# Patient Record
Sex: MALE | Race: WHITE | HISPANIC OR LATINO | Employment: PART TIME | URBAN - METROPOLITAN AREA
[De-identification: names, ages, dates, MRNs, and addresses within clinical notes are randomized per-mention and may not be internally consistent; named-entity substitution may affect disease eponyms.]

---

## 2017-02-09 ENCOUNTER — HOSPITAL ENCOUNTER (EMERGENCY)
Facility: HOSPITAL | Age: 57
Discharge: HOME | End: 2017-02-09
Attending: EMERGENCY MEDICINE

## 2017-02-09 VITALS
SYSTOLIC BLOOD PRESSURE: 160 MMHG | WEIGHT: 200 LBS | RESPIRATION RATE: 18 BRPM | BODY MASS INDEX: 25.67 KG/M2 | HEART RATE: 100 BPM | OXYGEN SATURATION: 98 % | HEIGHT: 74 IN | DIASTOLIC BLOOD PRESSURE: 90 MMHG | TEMPERATURE: 99 F

## 2017-02-09 DIAGNOSIS — M54.9 BACK PAIN, UNSPECIFIED BACK LOCATION, UNSPECIFIED BACK PAIN LATERALITY, UNSPECIFIED CHRONICITY: Primary | ICD-10-CM

## 2017-02-09 PROCEDURE — 25000003 PHARM REV CODE 250: Performed by: EMERGENCY MEDICINE

## 2017-02-09 PROCEDURE — 99283 EMERGENCY DEPT VISIT LOW MDM: CPT

## 2017-02-09 RX ORDER — OXYCODONE AND ACETAMINOPHEN 5; 325 MG/1; MG/1
1 TABLET ORAL EVERY 6 HOURS PRN
Qty: 20 TABLET | Refills: 0 | Status: SHIPPED | OUTPATIENT
Start: 2017-02-09 | End: 2018-05-02 | Stop reason: SDUPTHER

## 2017-02-09 RX ORDER — IBUPROFEN 400 MG/1
800 TABLET ORAL EVERY 6 HOURS PRN
Status: DISCONTINUED | OUTPATIENT
Start: 2017-02-09 | End: 2017-02-09 | Stop reason: HOSPADM

## 2017-02-09 RX ORDER — OXYCODONE AND ACETAMINOPHEN 5; 325 MG/1; MG/1
2 TABLET ORAL
Status: COMPLETED | OUTPATIENT
Start: 2017-02-09 | End: 2017-02-09

## 2017-02-09 RX ORDER — DIAZEPAM 5 MG/1
5 TABLET ORAL EVERY 8 HOURS PRN
Qty: 20 TABLET | Refills: 0 | Status: SHIPPED | OUTPATIENT
Start: 2017-02-09

## 2017-02-09 RX ORDER — DIAZEPAM 5 MG/1
5 TABLET ORAL
Status: COMPLETED | OUTPATIENT
Start: 2017-02-09 | End: 2017-02-09

## 2017-02-09 RX ORDER — IBUPROFEN 800 MG/1
800 TABLET ORAL 3 TIMES DAILY
Qty: 90 TABLET | Refills: 0 | Status: SHIPPED | OUTPATIENT
Start: 2017-02-09

## 2017-02-09 RX ORDER — LIDOCAINE 50 MG/G
3 PATCH TOPICAL ONCE
Qty: 10 PATCH | Refills: 0 | Status: SHIPPED | OUTPATIENT
Start: 2017-02-09 | End: 2017-02-09

## 2017-02-09 RX ADMIN — DIAZEPAM 5 MG: 5 TABLET ORAL at 07:02

## 2017-02-09 RX ADMIN — OXYCODONE AND ACETAMINOPHEN 2 TABLET: 5; 325 TABLET ORAL at 07:02

## 2017-02-09 RX ADMIN — IBUPROFEN 800 MG: 400 TABLET, FILM COATED ORAL at 07:02

## 2017-02-09 NOTE — ED AVS SNAPSHOT
[facility]  [address] [facility] [address]               Randy Mcwilliams   2017  7:15 AM   ED    Descripción:  Male : 1960   Departamento:  [facility]           Murphy Cuidado fue coordinado por:     Provider Role From To    Minoo Hurley MD Attending Provider 17 0718 --      Razón de la dmitry     Back Pain           Diagnósticos de Esta Visita        Comentarios    Back pain, unspecified back location, unspecified back pain laterality, unspecified chronicity    -  Primario       ED Disposition     Ninguna           Lista de tareas           Información de seguimiento     Realice un seguimiento con:  [facility]    Cómo:  Grace elizabeth dmitry lo antes posible    Especialidad:  Family Medicine    Información de contacto:    [address], [address]  [facility] [address] [phone]      Recetas para recoger        Disp Refills Start End    diazePAM (VALIUM) 5 MG tablet 20 tablet 0 2017     Take 1 tablet (5 mg total) by mouth every 8 (eight) hours as needed (muscle spasm). - Oral    [facility]: [facility] [address] [place] [address] No. de tlfo: #: [phone]       ibuprofen (ADVIL,MOTRIN) 800 MG tablet 90 tablet 0 2017     Take 1 tablet (800 mg total) by mouth 3 (three) times daily. - Oral    [facility]: [facility] [address] [place] [address] No. de tlfo: #: [phone]       oxycodone-acetaminophen (PERCOCET) 5-325 mg per tablet 20 tablet 0 2017     Take 1 tablet by mouth every 6 (six) hours as needed for Pain. - Oral    [facility]: [facility] [address] [place] [address] No. de tlfo: #: [phone]         [facility]     [facility] L[facility]nea de Enfermeras - Asistencia   Enfermeras registradas de [facility] pueden ayudarle a reservar elizabeth dmitry, proveer educación para la margarita, asesoría clínica, y otros servicios de asesoramiento.   Llame  para kristen servicio gratuito a [phone].             Medicamentos           Mensaje sobre Medicamentos     Verificar los cambios y / o adiciones a richmond régimen de medicación son los mismos que discutir con richmond médico. Si cualquiera de estos cambios o adiciones son incorrectos, por favor notifique a richmond proveedor de atención médica.        EMPEZAR a alba estos medicamentos NUEVOS        Refills    diazePAM (VALIUM) 5 MG tablet 0    Sig: Take 1 tablet (5 mg total) by mouth every 8 (eight) hours as needed (muscle spasm).    Categoría: Print    Vía: Oral    ibuprofen (ADVIL,MOTRIN) 800 MG tablet 0    Sig: Take 1 tablet (800 mg total) by mouth 3 (three) times daily.    Categoría: Print    Vía: Oral    oxycodone-acetaminophen (PERCOCET) 5-325 mg per tablet 0    Sig: Take 1 tablet by mouth every 6 (six) hours as needed for Pain.    Categoría: Print    Vía: Oral      These medications were administered today        Dose Freq    oxycodone-acetaminophen 5-325 mg per tablet 2 tablet 2 tablet ED 1 Time    Sig: Take 2 tablets by mouth ED 1 Time.    Categoría: Normal    Vía: Oral    ibuprofen tablet 800 mg 800 mg Every 6 hours PRN    Sig: Take 2 tablets (800 mg total) by mouth every 6 (six) hours as needed for mild pain 1-3/10 pain scale.    Categoría: Normal    Vía: Oral    diazePAM tablet 5 mg 5 mg ED 1 Time    Sig: Take 1 tablet (5 mg total) by mouth ED 1 Time.    Categoría: Normal    Vía: Oral           Verifique que la siguiente lista de medicamentos es elizabeth representación exacta de los medicamentos que está tomando actualmente. Si no hay ningunos reportados, la lista puede estar en ty. Si no es correcta, por favor póngase en contacto con richmond proveedor de atención médica. Lleve esta lista con usted en xochilt de emergencia.           Medicamentos Actuales     aspirin 81 MG Chew Take 1 tablet (81 mg total) by mouth once daily.    furosemide (LASIX) 40 MG tablet Take 1 tablet (40 mg total) by mouth 2 (two) times daily.     "carvedilol (COREG) 6.25 MG tablet Take 1 tablet (6.25 mg total) by mouth 2 (two) times daily.    diazePAM (VALIUM) 5 MG tablet Take 1 tablet (5 mg total) by mouth every 8 (eight) hours as needed (muscle spasm).    diazePAM tablet 5 mg Take 1 tablet (5 mg total) by mouth ED 1 Time.    fluticasone (FLONASE) 50 mcg/actuation nasal spray 1 spray by Each Nare route 2 (two) times daily as needed.    ibuprofen (ADVIL,MOTRIN) 800 MG tablet Take 1 tablet (800 mg total) by mouth 3 (three) times daily.    ibuprofen tablet 800 mg Take 2 tablets (800 mg total) by mouth every 6 (six) hours as needed for mild pain 1-3/10 pain scale.    lisinopril (PRINIVIL,ZESTRIL) 5 MG tablet Take 1 tablet (5 mg total) by mouth once daily.    oxycodone-acetaminophen (PERCOCET) 5-325 mg per tablet Take 1 tablet by mouth every 6 (six) hours as needed for Pain.    oxycodone-acetaminophen 5-325 mg per tablet 2 tablet Take 2 tablets by mouth ED 1 Time.    potassium chloride 20 mEq TbSR Take 20 mEq by mouth 2 (two) times daily.           Información de referencia clínica           Misty signos vitales abdirahman     PS Pulso Temperatura Resp [place] Peso    160/90 (BP Location: Right arm, Patient Position: Sitting) 100 98.6 °F (37 °C) (Oral) 18 6' 2" (1.88 m) 90.7 kg (200 lb)    SpO2 BMI (IMC)                98% 25.68 kg/m2          Alergias     A partir del:  2/9/2017        No Known Allergies      Vacunas     Administradas en la fecha de la visita:  2/9/2017        None      ED Micro, Lab, POCT     None      ED Imaging Orders     None        Instrucciones a zelalem de fili         Consejos Para El Cuidado De La Espalda [Back Care Tips]    Hay ciertas cosas que usted puede hacer para prevenir la recurrencia del dolor de espalda tiki y reducir los síntomas del dolor de espalda crónico:  · Mantenga un peso saludable. Si tiene sobrepeso, perder peso le ayudará a aliviar la mayoría de los ofelia de espalda.  · El ejercicio es elizabeth parte importante de la recuperación " de los ofelia de espalda. La espalda está soportada por los músculos que se encuentran detrás y scott de la columna vertebral. [place] significa que los músculos de la espalda y del abdomen deben fortalecerse para zelalem mejor soporte a la columna vertebral.   · Nadar y caminar a paso rápido son buenas formas de ejercicio para mejorar richmond nivel de forma física.  · Practique técnicas seguras para levantar objetos (chris la información que se presenta más abajo).  · Adopte posturas correctas al estar sentado, parado o caminando. Evite permanecer sentado lupe períodos largos, ya que esto implica más esfuerzo en la parte inferior de la espalda que cuando está de pie o caminando.  · Póngase zapatos de buena calidad con suficiente soporte del arco. La alineación de los pies y de los tobillos puede afectar los síntomas de la espalda. Las mujeres deben evitar los zapatos de tacón alto.  · Los masajes terapéuticos pueden ayudar a aliviar el dolor de espalda crónico.  · Lupe los primeros dos días después de elizabeth lesión severa o un ataque de dolor crónico de espalda, aplique elizabeth bolsa de hielo sobre la milagros dolorida lupe 20 minutos cada 2-4 horas. [place] reducirá la hinchazón y el dolor. El calor (duchas o wesly en [place] o elizabeth almohadilla calefactora) ayudan a reducir los espasmos musculares. Puede comenzar aplicando hielo y luego aplicar calor al cabo de dos días. Algunos pacientes se sienten mejor alternando los tratamientos con hielo y con calor. Utilice el método que le dé mejor resultado.  · Puede usar acetaminofén (Tylenol) o ibuprofeno (Motrin o Advil) para controlar el dolor, a menos que le hayan recetado otro medicamento.[NOTA: Si tiene elizabeth enfermedad hepática o renal crónica, o ha tenido alguna vez elizabeth úlcera estomacal o sangrado gastrointestinal, consulte con richmond médico antes de alba estos medicamentos.]     Estiramiento Lumbar  Jennifer es un ejercicio simple de estiramiento que le ayudará a relajar los  espasmos musculares y a mantener la espalda más flexible. Si el ejercicio le empeora el dolor de espalda, deje de hacerlo.  · Acuéstese boca arriba con las rodillas flexionadas y ambos pies apoyados en el piso.  · Levante lentamente la rodilla izquierda hacia el pecho, con la espalda plana contra el piso. Sostenga la posición lupe 5 segundos.  · Relájese y repita el ejercicio con la rodilla derecha.  · Repita el ejercicio 10 veces con cada pierna.  Técnica Para Levantar Objetos De Forma Guerrier  · No doble la cintura para levantar objetos del suelo. En vez de hacer eso, agáchese doblando las rodillas y las caderas.   · Mantenga la espalda recta y la zak erguida.   · Agarre el objeto cerca del cuerpo, directamente frente a usted.  · Enderece las piernas para levantar el objeto.   · Para bajar el objeto, siga la misma técnica en orden inverso.  · Si tiene que arrastrar un objeto por el suelo, empújelo.  Consejos Para Mantener Debbie [place] Postura  SENTADO  Siéntese en chet con respaldos rectos o con soporte para la parte inferior de la espalda. Mantenga las rodillas un poco más elevadas que las caderas. En xochilt necesario, use un banquito o taburete bajo para mantener los pies apoyados sobre debbie superficie sólida.  Siéntese con la espalda recta mientras maneja. Ajuste el asiento hacia adelante para no tener que inclinarse hacia el volante. Debbie almohada pequeña o debbie toalla enrollada detrás de la parte inferior de la espalda podría ser útil para conducir en viajes largos.   DE PIE  Cuando tenga que estar parado (de pie) lupe largos períodos apoye la mayor parte del peso sobre debbie pierna, cambiando de pierna cada pocos minutos.   AL DORMIR  La mejor manera de dormir es de lado, con las rodillas dobladas. Apoye la zak en debbie almohada baja para zelalem soporte al carl de forma que la columna vertebral quede en posición neutral. Evite las almohadas demasiado gruesas que doblan el carl hacia un lado. Ponga debbie  almohada entre las piernas para relajar más la parte inferior de la espalda. Si duerme boca arriba, ponga almohadas debajo de las rodillas para mantener las piernas ligeramente flexionadas. Use un colchón firme. Si richmond colchón se hunde en la parte donde duerme, cámbielo o use elizabeth tabla de ortiz contrachapada de media pulgada de espesor debajo del colchón para darle más soporte.  Programe elizabeth VISITA DE CONTROL con richmond médico, o según le indique nuestro personal médico.  [NOTA: Si le hicieron radiografías, tomografías computarizadas o resonancias magnéticas, estas serán examinadas por un radiólogo y le informarán de los nuevos hallazgos que puedan afectar la atención médica que necesita.]  Regrese Sin Demora  o llame al médico si nota alguno de los siguientes síntomas:  · El dolor empeora o se extiende a los brazos o a las piernas  · Debilidad o entumecimiento en kaitlynn o ambos brazos o piernas.  · Pérdida de control del intestino o de la vejiga  · Entumecimiento en la milagros de las ingles  Date Last Reviewed: 6/1/2016  © 8846-4038 [employer]. [address] [place] [id number]. Todos los derechos reservados. Esta información no pretende sustituir la atención médica profesional. Sólo richmond médico puede diagnosticar y tratar un problema de margarita.          Principios sobre la espalda: Elizabeth columna vertebral gracie  La columna vertebral gracie cuenta con ishmael curvas naturales que soportan el cuerpo y le permiten moverse libremente. Está rodeada de músculos sp y flexibles que la sostienen y mantienen la alineación correcta de lamont curvas. Entre los huesos de la columna vertebral hay unos discos amortiguadores que juegan un papel importante en la buena margarita de la espalda.    Ishmael curvas naturales  La columna vertebral contiene huesos (vértebras) y almohadillas de tejido blando (discos) dispuestos en ishmael curvas: cervical, torácica y lumbar. Si están pietro alineadas, estas curvas mantienen el equilibrio del  cuerpo y lo sostienen lupe el movimiento. Vicente a que distribuyen el peso por toda la columna vertebral, estas curvas disminuyen la probabilidad de lesionarse la espalda.  Músculos ps y flexibles  Es importante tener músculos sp y flexibles en la espalda, para ayudar la columna a sostener las shukri curvas dorsales y mantener la alineación correcta de las vértebras y los discos; y también en el abdomen, las caderas y las piernas, para reducir el esfuerzo de la espalda.  La curva lumbar  La curva lumbar es la parte de la columna vertebral que más fatmata trabaja, ya que es la que soporta más peso y más se mueve. La correcta alineación de esta curva puede prevenir daños a las vértebras, los discos y otras partes de la columna.  Discos amortiguadores  Los discos, unas almohadillas de tejido blando que separan a las vértebras, tienen la finalidad de amortiguar los impactos del movimiento. Cada disco se compone de un núcleo esponjoso en el centro rodeado de un anillo exterior más fatmata. Los movimientos en el interior del núcleo permiten a las vértebras oscilar de un lado a otro sobre los discos, proporcionando la flexibilidad necesaria para inclinarse y caminar.          Date Last Reviewed: 10/18/2015  © 2578-2233 [employer]. [address] [place] [address], [place] [address]. Todos los derechos reservados. Esta información no pretende sustituir la atención médica profesional. Sólo richmond médico puede diagnosticar y tratar un problema de margarita.          Registrarse para [facility]     La activación de richmond cuenta [facility] es tan fácil jeremias 1-2-3!    1) Ir a [email or web address], seleccione Registrarse Ahora, meter el código de activación y richmond fecha de nacimiento, y seleccione Próximo.    [id number]  Expires: 3/26/2017  7:42 AM      2) Crear un nombre de usuario y contraseña para usar cuando se visita [facility] en el futuro y selecciona elizabeth pregunta de seguridad en xochilt de que pierda richmond contraseña y seleccione  Próximo.    3) Introduzca richmond dirección de correo electrónico y agus carl en Registrarse!    Información Adicional  Si tiene alguna pregunta, por favor, e-mail [email or web address] o llame al [phone] para hablar con nuestro personal. Recuerde, [facility] no debe ser usada para necesidades urgentes. En xochilt de emergencia médica, llame al [phone].         [facility] cumple con las leyes federales aplicables de derechos civiles y no discrimina por motivos de yesenia, color, origen nacional, edad, discapacidad, o sexo.        Language Assistance Services     ATTENTION: Language assistance services are available, free of charge. Please call [phone].      ATENCIÓN: Si habla español, tiene a richmond disposición servicios gratuitos de asistencia lingüística. Llame al [phone].     CHÚ Ý: N?u b?n nói Ti?ng Vi?t, có các d?ch v? h? tr? ngôn ng? mi?n phí dành cho b?n. G?i s? [phone].                      [facility]  [address] [facility] [address]               Randy Mcwilliams   2017  7:15 AM   ED    Description:  Male : 1960   Department:  [facility]           Your Care was Coordinated By:     Provider Role From To    Minoo Hurley MD Attending Provider 17 0718 --      Reason for Visit     Back Pain           Diagnoses this Visit        Comments    Back pain, unspecified back location, unspecified back pain laterality, unspecified chronicity    -  Primary       ED Disposition     None           To Do List           Follow-up Information     Schedule an appointment as soon as possible for a visit with [facility].    Specialty:  Family Medicine    Contact information:    [address], [address]  [facility] [address] [phone]       These Medications        Disp Refills Start End    diazePAM (VALIUM) 5 MG tablet 20 tablet 0 2017     Take 1 tablet (5 mg total) by mouth every 8  (eight) hours as needed (muscle spasm). - Oral    Pharmacy: [address] #: [phone]       ibuprofen (ADVIL,MOTRIN) 800 MG tablet 90 tablet 0 2/9/2017     Take 1 tablet (800 mg total) by mouth 3 (three) times daily. - Oral    Pharmacy: [address] #: [phone]       oxycodone-acetaminophen (PERCOCET) 5-325 mg per tablet 20 tablet 0 2/9/2017     Take 1 tablet by mouth every 6 (six) hours as needed for Pain. - Oral    Pharmacy: [address] #: [phone]         [facility]     [facility] - 24/7 Assistance  Registered nurses in the [facility] provide clinical advisement, health education, appointment booking, and other advisory services.  Call for this free service at [phone].             Medications           Message regarding Medications     Verify the changes and/or additions to your medication regime listed below are the same as discussed with your clinician today.  If any of these changes or additions are incorrect, please notify your healthcare provider.        START taking these NEW medications        Refills    diazePAM (VALIUM) 5 MG tablet 0    Sig: Take 1 tablet (5 mg total) by mouth every 8 (eight) hours as needed (muscle spasm).    Class: Print    Route: Oral    ibuprofen (ADVIL,MOTRIN) 800 MG tablet 0    Sig: Take 1 tablet (800 mg total) by mouth 3 (three) times daily.    Class: Print    Route: Oral    oxycodone-acetaminophen (PERCOCET) 5-325 mg per tablet 0    Sig: Take 1 tablet by mouth every 6 (six) hours as needed for Pain.    Class: Print    Route: Oral      These medications were administered today        Dose Freq    oxycodone-acetaminophen 5-325 mg per tablet 2 tablet 2 tablet ED 1 Time    Sig: Take 2 tablets by mouth ED 1 Time.    Class: Normal    Route: Oral    ibuprofen tablet 800 mg 800 mg Every 6  hours PRN    Sig: Take 2 tablets (800 mg total) by mouth every 6 (six) hours as needed for mild pain 1-3/10 pain scale.    Class: Normal    Route: Oral    diazePAM tablet 5 mg 5 mg ED 1 Time    Sig: Take 1 tablet (5 mg total) by mouth ED 1 Time.    Class: Normal    Route: Oral           Verify that the below list of medications is an accurate representation of the medications you are currently taking.  If none reported, the list may be blank. If incorrect, please contact your healthcare provider. Carry this list with you in case of emergency.           Current Medications     aspirin 81 MG Chew Take 1 tablet (81 mg total) by mouth once daily.    furosemide (LASIX) 40 MG tablet Take 1 tablet (40 mg total) by mouth 2 (two) times daily.    carvedilol (COREG) 6.25 MG tablet Take 1 tablet (6.25 mg total) by mouth 2 (two) times daily.    diazePAM (VALIUM) 5 MG tablet Take 1 tablet (5 mg total) by mouth every 8 (eight) hours as needed (muscle spasm).    diazePAM tablet 5 mg Take 1 tablet (5 mg total) by mouth ED 1 Time.    fluticasone (FLONASE) 50 mcg/actuation nasal spray 1 spray by Each Nare route 2 (two) times daily as needed.    ibuprofen (ADVIL,MOTRIN) 800 MG tablet Take 1 tablet (800 mg total) by mouth 3 (three) times daily.    ibuprofen tablet 800 mg Take 2 tablets (800 mg total) by mouth every 6 (six) hours as needed for mild pain 1-3/10 pain scale.    lisinopril (PRINIVIL,ZESTRIL) 5 MG tablet Take 1 tablet (5 mg total) by mouth once daily.    oxycodone-acetaminophen (PERCOCET) 5-325 mg per tablet Take 1 tablet by mouth every 6 (six) hours as needed for Pain.    oxycodone-acetaminophen 5-325 mg per tablet 2 tablet Take 2 tablets by mouth ED 1 Time.    potassium chloride 20 mEq TbSR Take 20 mEq by mouth 2 (two) times daily.           Clinical Reference Information           Your Vitals Were     BP Pulse Temp Resp Height Weight    160/90 (BP Location: Right arm, Patient Position: Sitting) 100 98.6 °F (37 °C) (Oral)  "18 6' 2" (1.88 m) 90.7 kg (200 lb)    SpO2 BMI                98% 25.68 kg/m2          Allergies as of 2/9/2017     No Known Allergies      Immunizations Administered on Date of Encounter - 2/9/2017     None      ED Micro, Lab, POCT     None      ED Imaging Orders     None        Discharge Instructions         Consejos Para El Cuidado De La Espalda [Back Care Tips]    Hay ciertas cosas que usted puede hacer para prevenir la recurrencia del dolor de espalda tiki y reducir los síntomas del dolor de espalda crónico:  · Mantenga un peso saludable. Si tiene sobrepeso, perder peso le ayudará a aliviar la mayoría de los ofelia de espalda.  · El ejercicio es elizabeth parte importante de la recuperación de los ofelia de espalda. La espalda está soportada por los músculos que se encuentran detrás y scott de la columna vertebral. [place] significa que los músculos de la espalda y del abdomen deben fortalecerse para zelalem mejor soporte a la columna vertebral.   · Nadar y caminar a paso rápido son buenas formas de ejercicio para mejorar richmond nivel de forma física.  · Practique técnicas seguras para levantar objetos (chris la información que se presenta más abajo).  · Adopte posturas correctas al estar sentado, parado o caminando. Evite permanecer sentado lupe períodos largos, ya que esto implica más esfuerzo en la parte inferior de la espalda que cuando está de pie o caminando.  · Póngase zapatos de buena calidad con suficiente soporte del arco. La alineación de los pies y de los tobillos puede afectar los síntomas de la espalda. Las mujeres deben evitar los zapatos de tacón alto.  · Los masajes terapéuticos pueden ayudar a aliviar el dolor de espalda crónico.  · Lupe los primeros dos días después de elizabeth lesión severa o un ataque de dolor crónico de espalda, aplique elizabeth bolsa de hielo sobre la milagros dolorida lupe 20 minutos cada 2-4 horas. [place] reducirá la hinchazón y el dolor. El calor (duchas o wesly en [place] o elizabeth " almohadilla calefactora) ayudan a reducir los espasmos musculares. Puede comenzar aplicando hielo y luego aplicar calor al cabo de dos días. Algunos pacientes se sienten mejor alternando los tratamientos con hielo y con calor. Utilice el método que le dé mejor resultado.  · Puede usar acetaminofén (Tylenol) o ibuprofeno (Motrin o Advil) para controlar el dolor, a menos que le hayan recetado otro medicamento.[NOTA: Si tiene elizabeth enfermedad hepática o renal crónica, o ha tenido alguna vez elizabeth úlcera estomacal o sangrado gastrointestinal, consulte con richmond médico antes de alba estos medicamentos.]     Estiramiento Lumbar  Jennifer es un ejercicio simple de estiramiento que le ayudará a relajar los espasmos musculares y a mantener la espalda más flexible. Si el ejercicio le empeora el dolor de espalda, deje de hacerlo.  · Acuéstese boca arriba con las rodillas flexionadas y ambos pies apoyados en el piso.  · Levante lentamente la rodilla izquierda hacia el pecho, con la espalda plana contra el piso. Sostenga la posición lupe 5 segundos.  · Relájese y repita el ejercicio con la rodilla derecha.  · Repita el ejercicio 10 veces con cada pierna.  Técnica Para Levantar Objetos De Forma Guerrier  · No doble la cintura para levantar objetos del suelo. En vez de hacer eso, agáchese doblando las rodillas y las caderas.   · Mantenga la espalda recta y la zak erguida.   · Agarre el objeto cerca del cuerpo, directamente frente a usted.  · Enderece las piernas para levantar el objeto.   · Para bajar el objeto, siga la misma técnica en orden inverso.  · Si tiene que arrastrar un objeto por el suelo, empújelo.  Consejos Para Mantener Elizabeth [place] Postura  SENTADO  Siéntese en chet con respaldos rectos o con soporte para la parte inferior de la espalda. Mantenga las rodillas un poco más elevadas que las caderas. En xochilt necesario, use un banquito o taburete bajo para mantener los pies apoyados sobre elizabeth superficie sólida.  Siéntese con la  espalda recta mientras maneja. Ajuste el asiento hacia adelante para no tener que inclinarse hacia el volante. Elizabeth almohada pequeña o elizabeth toalla enrollada detrás de la parte inferior de la espalda podría ser útil para conducir en viajes largos.   DE PIE  Cuando tenga que estar parado (de pie) lupe largos períodos apoye la mayor parte del peso sobre elizabeth pierna, cambiando de pierna cada pocos minutos.   AL DORMIR  La mejor manera de dormir es de lado, con las rodillas dobladas. Apoye la zak en elizabeth almohada baja para zelalem soporte al carl de forma que la columna vertebral quede en posición neutral. Evite las almohadas demasiado gruesas que doblan el carl hacia un lado. Ponga elizabeth almohada entre las piernas para relajar más la parte inferior de la espalda. Si duerme boca arriba, ponga almohadas debajo de las rodillas para mantener las piernas ligeramente flexionadas. Use un colchón firme. Si richmond colchón se hunde en la parte donde duerme, cámbielo o use elizabeth tabla de ortiz contrachapada de media pulgada de espesor debajo del colchón para darle más soporte.  Programe elizabeth VISITA DE CONTROL con richmond médico, o según le indique nuestro personal médico.  [NOTA: Si le hicieron radiografías, tomografías computarizadas o resonancias magnéticas, estas serán examinadas por un radiólogo y le informarán de los nuevos hallazgos que puedan afectar la atención médica que necesita.]  Regrese Sin Demora  o llame al médico si nota alguno de los siguientes síntomas:  · El dolor empeora o se extiende a los brazos o a las piernas  · Debilidad o entumecimiento en kaitlynn o ambos brazos o piernas.  · Pérdida de control del intestino o de la vejiga  · Entumecimiento en la milagros de las ingles  Date Last Reviewed: 6/1/2016  © 3705-5429 [employer]. [address] [place] [address]. Todos los derechos reservados. Esta información no pretende sustituir la atención médica profesional. Sólo richmond médico puede diagnosticar y tratar  un problema de margarita.          Principios sobre la espalda: Debbie columna vertebral gracie  La columna vertebral gracie cuenta con ishmael curvas naturales que soportan el cuerpo y le permiten moverse libremente. Está rodeada de músculos sp y flexibles que la sostienen y mantienen la alineación correcta de lamont curvas. Entre los huesos de la columna vertebral hay unos discos amortiguadores que juegan un papel importante en la buena margarita de la espalda.    Ishmael curvas naturales  La columna vertebral contiene huesos (vértebras) y almohadillas de tejido blando (discos) dispuestos en ishmael curvas: cervical, torácica y lumbar. Si están pietro alineadas, estas curvas mantienen el equilibrio del cuerpo y lo sostienen lupe el movimiento. Vicente a que distribuyen el peso por toda la columna vertebral, estas curvas disminuyen la probabilidad de lesionarse la espalda.  Músculos sp y flexibles  Es importante tener músculos sp y flexibles en la espalda, para ayudar la columna a sostener las ishmael curvas dorsales y mantener la alineación correcta de las vértebras y los discos; y también en el abdomen, las caderas y las piernas, para reducir el esfuerzo de la espalda.  La curva lumbar  La curva lumbar es la parte de la columna vertebral que más fatmata trabaja, ya que es la que soporta más peso y más se mueve. La correcta alineación de esta curva puede prevenir daños a las vértebras, los discos y otras partes de la columna.  Discos amortiguadores  Los discos, unas almohadillas de tejido blando que separan a las vértebras, tienen la finalidad de amortiguar los impactos del movimiento. Cada disco se compone de un núcleo esponjoso en el centro rodeado de un anillo exterior más fatmata. Los movimientos en el interior del núcleo permiten a las vértebras oscilar de un lado a otro sobre los discos, proporcionando la flexibilidad necesaria para inclinarse y caminar.          Date Last Reviewed: 10/18/2015  © 6643-7799 [employer] [address] [place] [id number]. Todos los derechos reservados. Esta información no pretende sustituir la atención médica profesional. Sólo richmond médico puede diagnosticar y tratar un problema de margarita.          [facility] Sign-Up     Activating your [facility] account is as easy as 1-2-3!     1) Visit [email or web address], select Sign Up Now, enter this activation code and your date of birth, then select Next.  [email or web address]  Expires: 3/26/2017  7:42 AM      2) Create a username and password to use when you visit [facility] in the future and select a security question in case you lose your password and select Next.    3) Enter your e-mail address and click Sign Up!    Additional Information  If you have questions, please e-mail [email or web address] or call [phone] to talk to our [facility] staff. Remember, [facility] is NOT to be used for urgent needs. For medical emergencies, dial 911.          [facility] complies with applicable Federal civil rights laws and does not discriminate on the basis of race, color, national origin, age, disability, or sex.        Language Assistance Services     ATTENTION: Language assistance services are available, free of charge. Please call [phone].      ATENCIÓN: Si habla español, tiene a richmond disposición servicios gratuitos de asistencia lingüística. Llame al [phone].     CHÚ Ý: N?u b?n nói Ti?ng Vi?t, có các d?ch v? h? tr? ngôn ng? mi?n phí dành cho b?n. G?i s? [phone].

## 2017-02-09 NOTE — ED PROVIDER NOTES
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  Denies pain with coughing sneezing.  Denies weakness or numbness.  He went to sleep last night and awoke this morning and feels like he is having a hard time walking because of the pain.  Denies urinary or bowel incontinence.    Patient is a 56 y.o. male presenting with the following complaint: back pain. The history is provided by the patient.   Back Pain    This is a new problem. The current episode started yesterday. The problem occurs constantly. The problem has been gradually worsening. The pain is associated with lifting heavy objects. The pain is present in the lumbar spine. The quality of the pain is described as aching. The pain does not radiate. The pain is at a severity of 7/10. The symptoms are aggravated by bending and certain positions. The pain is worse during the day. Pertinent negatives include no fever, no numbness, no abdominal swelling and no bowel incontinence. He has tried ice for the symptoms. The treatment provided mild relief.     Past Medical History   Diagnosis Date    Acute systolic congestive heart failure 1/7/2016     EF 20%     Past Medical History Pertinent Negatives   Diagnosis Date Noted    Diabetes mellitus 1/7/2016    Encounter for blood transfusion 1/7/2016     Past Surgical History   Procedure Laterality Date    Leg surgery       No family history on file.  Social History   Substance Use Topics    Smoking status: Never Smoker    Smokeless tobacco: Not on file    Alcohol use No     Review of Systems   Constitutional: Negative for fever.   Eyes: Negative.    Respiratory: Negative.     Gastrointestinal: Negative for bowel incontinence.   Genitourinary: Negative.    Musculoskeletal: Positive for back pain.   Neurological: Negative for numbness.   All other systems reviewed and are negative.      Physical Exam   Initial Vitals   BP Pulse Resp Temp SpO2   02/09/17 0713 02/09/17 0713 02/09/17 0713 02/09/17 0713 02/09/17 0713   160/90 100 18 98.6 °F (37 °C) 98 %     Physical Exam    Nursing note and vitals reviewed.  Constitutional: He appears well-developed and well-nourished.   HENT:   Head: Normocephalic.   Eyes: EOM are normal. Pupils are equal, round, and reactive to light.   Neck: Normal range of motion.   Cardiovascular: Normal rate.   Pulmonary/Chest: Breath sounds normal.   Abdominal: Soft. Bowel sounds are normal.   Musculoskeletal: Normal range of motion.   He has pain with flexion less pain with extension he has 5 out of 5 strength of his lower extremities.  He walks on his tiptoes he has no clonus no hyperreflexia.   Neurological: He is alert and oriented to person, place, and time.   Skin: Skin is warm and dry.   Psychiatric: He has a normal mood and affect. His behavior is normal. Thought content normal.         ED Course   Procedures  Labs Reviewed - No data to display          Medical Decision Making:   Initial Assessment:   56-year-old otherwise healthy male with lower back pain that occurred after lifting heavy sheet rock  Differential Diagnosis:   Muscular spasm, no evidence of fracture  ED Management:  Patient given instructions on how to lift with his legs not his back.  Reviewed some exercises.  Given recommendations or concourse strengthening.  Given pain medications, and follow-up with his primary care doctor.                   ED Course     Clinical Impression:    The encounter diagnosis was Back pain, unspecified back location, unspecified back pain laterality, unspecified chronicity.          Minoo Hurley MD  02/09/17 6314

## 2017-02-09 NOTE — DISCHARGE INSTRUCTIONS
Consejos Para El Cuidado De La Espalda [Back Care Tips]    Hay ciertas cosas que usted puede hacer para prevenir la recurrencia del dolor de espalda tiki y reducir los síntomas del dolor de espalda crónico:  · Mantenga un peso saludable. Si tiene sobrepeso, perder peso le ayudará a aliviar la mayoría de los ofelia de espalda.  · El ejercicio es elizabeth parte importante de la recuperación de los ofelia de espalda. La espalda está soportada por los músculos que se encuentran detrás y scott de la columna vertebral. [place] significa que los músculos de la espalda y del abdomen deben fortalecerse para zelalem mejor soporte a la columna vertebral.   · Nadar y caminar a paso rápido son buenas formas de ejercicio para mejorar richmond nivel de forma física.  · Practique técnicas seguras para levantar objetos (chris la información que se presenta más abajo).  · Adopte posturas correctas al estar sentado, parado o caminando. Evite permanecer sentado lupe períodos largos, ya que esto implica más esfuerzo en la parte inferior de la espalda que cuando está de pie o caminando.  · Póngase zapatos de buena calidad con suficiente soporte del arco. La alineación de los pies y de los tobillos puede afectar los síntomas de la espalda. Las mujeres deben evitar los zapatos de tacón alto.  · Los masajes terapéuticos pueden ayudar a aliviar el dolor de espalda crónico.  · Lupe los primeros dos días después de elizabeth lesión severa o un ataque de dolor crónico de espalda, aplique elizabeth bolsa de hielo sobre la milagros dolorida lupe 20 minutos cada 2-4 horas. [place] reducirá la hinchazón y el dolor. El calor (duchas o wesly en [employer] o elizabeth almohadilla calefactora) ayudan a reducir los espasmos musculares. Puede comenzar aplicando hielo y luego aplicar calor al cabo de dos días. Algunos pacientes se sienten mejor alternando los tratamientos con hielo y con calor. Utilice el método que le dé mejor resultado.  · Puede usar acetaminofén (Tylenol) o  ibuprofeno (Motrin o Advil) para controlar el dolor, a menos que le hayan recetado otro medicamento.[NOTA: Si tiene elizabeth enfermedad hepática o renal crónica, o ha tenido alguna vez elizabeth úlcera estomacal o sangrado gastrointestinal, consulte con richmond médico antes de alba estos medicamentos.]     Estiramiento Lumbar  Jennifer es un ejercicio simple de estiramiento que le ayudará a relajar los espasmos musculares y a mantener la espalda más flexible. Si el ejercicio le empeora el dolor de espalda, deje de hacerlo.  · Acuéstese boca arriba con las rodillas flexionadas y ambos pies apoyados en el piso.  · Levante lentamente la rodilla izquierda hacia el pecho, con la espalda plana contra el piso. Sostenga la posición lupe 5 segundos.  · Relájese y repita el ejercicio con la rodilla derecha.  · Repita el ejercicio 10 veces con cada pierna.  Técnica Para Levantar Objetos De Forma Guerrier  · No doble la cintura para levantar objetos del suelo. En vez de hacer eso, agáchese doblando las rodillas y las caderas.   · Mantenga la espalda recta y la zak erguida.   · Agarre el objeto cerca del cuerpo, directamente frente a usted.  · Enderece las piernas para levantar el objeto.   · Para bajar el objeto, siga la misma técnica en orden inverso.  · Si tiene que arrastrar un objeto por el suelo, empújelo.  Consejos Para Mantener Elizabeth [place] Postura  SENTADO  Siéntese en chet con respaldos rectos o con soporte para la parte inferior de la espalda. Mantenga las rodillas un poco más elevadas que las caderas. En xochilt necesario, use un banquito o taburete bajo para mantener los pies apoyados sobre elizabeth superficie sólida.  Siéntese con la espalda recta mientras maneja. Ajuste el asiento hacia adelante para no tener que inclinarse hacia el volante. Elizabeth almohada pequeña o elizabeth toalla enrollada detrás de la parte inferior de la espalda podría ser útil para conducir en viajes largos.   DE PIE  Cuando tenga que estar parado (de pie) lupe largos  períodos apoye la mayor parte del peso sobre elizabeth pierna, cambiando de pierna cada pocos minutos.   AL DORMIR  La mejor manera de dormir es de lado, con las rodillas dobladas. Apoye la zak en elizabeth almohada baja para zelalem soporte al carl de forma que la columna vertebral quede en posición neutral. Evite las almohadas demasiado gruesas que doblan el carl hacia un lado. Ponga elizabeth almohada entre las piernas para relajar más la parte inferior de la espalda. Si duerme boca arriba, ponga almohadas debajo de las rodillas para mantener las piernas ligeramente flexionadas. Use un colchón firme. Si richmond colchón se hunde en la parte donde duerme, cámbielo o use elizabeth tabla de ortiz contrachapada de media pulgada de espesor debajo del colchón para darle más soporte.  Programe elizabeth VISITA DE CONTROL con richmond médico, o según le indique nuestro personal médico.  [NOTA: Si le hicieron radiografías, tomografías computarizadas o resonancias magnéticas, estas serán examinadas por un radiólogo y le informarán de los nuevos hallazgos que puedan afectar la atención médica que necesita.]  Regrese Sin Demora  o llame al médico si nota alguno de los siguientes síntomas:  · El dolor empeora o se extiende a los brazos o a las piernas  · Debilidad o entumecimiento en kaitlynn o ambos brazos o piernas.  · Pérdida de control del intestino o de la vejiga  · Entumecimiento en la milagros de las ingles  Date Last Reviewed: 6/1/2016  © 8989-6937 [employer]. [address] [place] [address], [place] [address]. Todos los derechos reservados. Esta información no pretende sustituir la atención médica profesional. Sólo richmond médico puede diagnosticar y tratar un problema de margarita.          Principios sobre la espalda: Elizabeth columna vertebral gracie  La columna vertebral gracie cuenta con shukri curvas naturales que soportan el cuerpo y le permiten moverse libremente. Está rodeada de músculos sp y flexibles que la sostienen y mantienen la alineación correcta de lamont  curvas. Entre los huesos de la columna vertebral hay unos discos amortiguadores que juegan un papel importante en la buena margarita de la espalda.    Ishmael curvas naturales  La columna vertebral contiene huesos (vértebras) y almohadillas de tejido blando (discos) dispuestos en ishmael curvas: cervical, torácica y lumbar. Si están pietro alineadas, estas curvas mantienen el equilibrio del cuerpo y lo sostienen lupe el movimiento. Vicente a que distribuyen el peso por toda la columna vertebral, estas curvas disminuyen la probabilidad de lesionarse la espalda.  Músculos sp y flexibles  Es importante tener músculos sp y flexibles en la espalda, para ayudar la columna a sostener las ishmael curvas dorsales y mantener la alineación correcta de las vértebras y los discos; y también en el abdomen, las caderas y las piernas, para reducir el esfuerzo de la espalda.  La curva lumbar  La curva lumbar es la parte de la columna vertebral que más fatmata trabaja, ya que es la que soporta más peso y más se mueve. La correcta alineación de esta curva puede prevenir daños a las vértebras, los discos y otras partes de la columna.  Discos amortiguadores  Los discos, unas almohadillas de tejido blando que separan a las vértebras, tienen la finalidad de amortiguar los impactos del movimiento. Cada disco se compone de un núcleo esponjoso en el centro rodeado de un anillo exterior más fatmata. Los movimientos en el interior del núcleo permiten a las vértebras oscilar de un lado a otro sobre los discos, proporcionando la flexibilidad necesaria para inclinarse y caminar.          Date Last Reviewed: 10/18/2015  © 8750-7240 [email or web address] [address] [place] [address], [place] [address]. Todos los derechos reservados. Esta información no pretende sustituir la atención médica profesional. Sólo richmond médico puede diagnosticar y tratar un problema de margarita.

## 2017-02-09 NOTE — ED NOTES
APPEARANCE: Alert, oriented and in no acute distress.  CARDIAC: Normal rate and rhythm, no murmur heard.   PERIPHERAL VASCULAR: peripheral pulses present. Normal cap refill. No edema. Warm to touch.    RESPIRATORY:Normal rate and effort, breath sounds clear bilaterally throughout chest. Respirations are equal and unlabored no obvious signs of distress.  GASTRO: soft, bowel sounds normal, no tenderness, no abdominal distention.  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 Denies radiation. No obvious deformity.  SKIN: Skin is warm and dry, normal skin turgor, mucous membranes moist.  NEURO: 5/5 strength major flexors/extensors bilaterally. Sensory intact to light touch bilaterally. Yimi coma scale: eyes open spontaneously-4, oriented & converses-5, obeys commands-6. No neurological abnormalities.   MENTAL STATUS: awake, alert and aware of environment.  EYE: PERRL, both eyes: pupils brisk and reactive to light. Normal size.  ENT: EARS: no obvious drainage. NOSE: no active bleeding.   BREAST: symmetrical. No masses. No tenderness.  GENITALIA: Normal external genitalia.

## 2017-02-19 ENCOUNTER — HOSPITAL ENCOUNTER (INPATIENT)
Facility: HOSPITAL | Age: 57
LOS: 1 days | Discharge: HOME | DRG: 293 | End: 2017-02-20
Attending: EMERGENCY MEDICINE | Admitting: FAMILY MEDICINE

## 2017-02-19 DIAGNOSIS — I10 ESSENTIAL HYPERTENSION: Chronic | ICD-10-CM

## 2017-02-19 DIAGNOSIS — R06.02 SOB (SHORTNESS OF BREATH): ICD-10-CM

## 2017-02-19 DIAGNOSIS — I50.21 ACUTE SYSTOLIC CONGESTIVE HEART FAILURE: Primary | ICD-10-CM

## 2017-02-19 DIAGNOSIS — I50.9 CONGESTIVE HEART FAILURE, UNSPECIFIED CONGESTIVE HEART FAILURE CHRONICITY, UNSPECIFIED CONGESTIVE HEART FAILURE TYPE: ICD-10-CM

## 2017-02-19 LAB
ALBUMIN SERPL BCP-MCNC: 3.6 G/DL
ALP SERPL-CCNC: 97 U/L
ALT SERPL W/O P-5'-P-CCNC: 16 U/L
ANION GAP SERPL CALC-SCNC: 12 MMOL/L
AST SERPL-CCNC: 16 U/L
BASOPHILS # BLD AUTO: 0.01 K/UL
BASOPHILS NFR BLD: 0.1 %
BILIRUB SERPL-MCNC: 1.6 MG/DL
BILIRUB UR QL STRIP: NEGATIVE
BNP SERPL-MCNC: 961 PG/ML
BUN SERPL-MCNC: 10 MG/DL
CALCIUM SERPL-MCNC: 9.1 MG/DL
CHLORIDE SERPL-SCNC: 106 MMOL/L
CHOLEST/HDLC SERPL: 2.5 {RATIO}
CLARITY UR: CLEAR
CO2 SERPL-SCNC: 18 MMOL/L
COLOR UR: YELLOW
CREAT SERPL-MCNC: 0.8 MG/DL
CREAT UR-MCNC: 18.2 MG/DL
D DIMER PPP IA.FEU-MCNC: 1.29 MG/L FEU
DIFFERENTIAL METHOD: ABNORMAL
EOSINOPHIL # BLD AUTO: 0 K/UL
EOSINOPHIL NFR BLD: 0.1 %
ERYTHROCYTE [DISTWIDTH] IN BLOOD BY AUTOMATED COUNT: 13.7 %
EST. GFR  (AFRICAN AMERICAN): >60 ML/MIN/1.73 M^2
EST. GFR  (NON AFRICAN AMERICAN): >60 ML/MIN/1.73 M^2
GLUCOSE SERPL-MCNC: 126 MG/DL
GLUCOSE UR QL STRIP: NEGATIVE
HCT VFR BLD AUTO: 47.3 %
HDL/CHOLESTEROL RATIO: 39.3 %
HDLC SERPL-MCNC: 117 MG/DL
HDLC SERPL-MCNC: 46 MG/DL
HGB BLD-MCNC: 16.5 G/DL
HGB UR QL STRIP: NEGATIVE
INR PPP: 1.1
KETONES UR QL STRIP: NEGATIVE
LACTATE SERPL-SCNC: 1 MMOL/L
LDLC SERPL CALC-MCNC: 53.4 MG/DL
LEUKOCYTE ESTERASE UR QL STRIP: NEGATIVE
LYMPHOCYTES # BLD AUTO: 1.6 K/UL
LYMPHOCYTES NFR BLD: 11.6 %
MCH RBC QN AUTO: 27.8 PG
MCHC RBC AUTO-ENTMCNC: 34.9 %
MCV RBC AUTO: 80 FL
MONOCYTES # BLD AUTO: 0.7 K/UL
MONOCYTES NFR BLD: 5.2 %
NEUTROPHILS # BLD AUTO: 11.5 K/UL
NEUTROPHILS NFR BLD: 82.8 %
NITRITE UR QL STRIP: NEGATIVE
NONHDLC SERPL-MCNC: 71 MG/DL
PH UR STRIP: 7 [PH] (ref 5–8)
PLATELET # BLD AUTO: 228 K/UL
PMV BLD AUTO: 11.5 FL
POTASSIUM SERPL-SCNC: 3.4 MMOL/L
PROT SERPL-MCNC: 7.2 G/DL
PROT UR QL STRIP: NEGATIVE
PROT UR-MCNC: <7 MG/DL
PROT/CREAT RATIO, UR: ABNORMAL
PROTHROMBIN TIME: 11.6 SEC
RBC # BLD AUTO: 5.94 M/UL
SODIUM SERPL-SCNC: 136 MMOL/L
SP GR UR STRIP: 1.01 (ref 1–1.03)
TRIGL SERPL-MCNC: 88 MG/DL
TROPONIN I SERPL DL<=0.01 NG/ML-MCNC: 0.04 NG/ML
TROPONIN I SERPL DL<=0.01 NG/ML-MCNC: 0.05 NG/ML
TROPONIN I SERPL DL<=0.01 NG/ML-MCNC: 0.05 NG/ML
URN SPEC COLLECT METH UR: NORMAL
UROBILINOGEN UR STRIP-ACNC: NEGATIVE EU/DL
WBC # BLD AUTO: 13.83 K/UL

## 2017-02-19 PROCEDURE — 83880 ASSAY OF NATRIURETIC PEPTIDE: CPT

## 2017-02-19 PROCEDURE — 85379 FIBRIN DEGRADATION QUANT: CPT

## 2017-02-19 PROCEDURE — 85025 COMPLETE CBC W/AUTO DIFF WBC: CPT

## 2017-02-19 PROCEDURE — 96365 THER/PROPH/DIAG IV INF INIT: CPT

## 2017-02-19 PROCEDURE — 36415 COLL VENOUS BLD VENIPUNCTURE: CPT

## 2017-02-19 PROCEDURE — 93005 ELECTROCARDIOGRAM TRACING: CPT

## 2017-02-19 PROCEDURE — 11000001 HC ACUTE MED/SURG PRIVATE ROOM

## 2017-02-19 PROCEDURE — 80061 LIPID PANEL: CPT

## 2017-02-19 PROCEDURE — 80074 ACUTE HEPATITIS PANEL: CPT

## 2017-02-19 PROCEDURE — 25000003 PHARM REV CODE 250: Performed by: FAMILY MEDICINE

## 2017-02-19 PROCEDURE — 84484 ASSAY OF TROPONIN QUANT: CPT | Mod: 91

## 2017-02-19 PROCEDURE — 87040 BLOOD CULTURE FOR BACTERIA: CPT | Mod: 59

## 2017-02-19 PROCEDURE — 96375 TX/PRO/DX INJ NEW DRUG ADDON: CPT

## 2017-02-19 PROCEDURE — 83605 ASSAY OF LACTIC ACID: CPT

## 2017-02-19 PROCEDURE — 85610 PROTHROMBIN TIME: CPT

## 2017-02-19 PROCEDURE — 63600175 PHARM REV CODE 636 W HCPCS: Performed by: EMERGENCY MEDICINE

## 2017-02-19 PROCEDURE — 81003 URINALYSIS AUTO W/O SCOPE: CPT

## 2017-02-19 PROCEDURE — 84156 ASSAY OF PROTEIN URINE: CPT

## 2017-02-19 PROCEDURE — 25500020 PHARM REV CODE 255: Performed by: EMERGENCY MEDICINE

## 2017-02-19 PROCEDURE — 84484 ASSAY OF TROPONIN QUANT: CPT

## 2017-02-19 PROCEDURE — 63600175 PHARM REV CODE 636 W HCPCS: Performed by: FAMILY MEDICINE

## 2017-02-19 PROCEDURE — 27000221 HC OXYGEN, UP TO 24 HOURS

## 2017-02-19 PROCEDURE — 94761 N-INVAS EAR/PLS OXIMETRY MLT: CPT

## 2017-02-19 PROCEDURE — 80053 COMPREHEN METABOLIC PANEL: CPT

## 2017-02-19 PROCEDURE — 99285 EMERGENCY DEPT VISIT HI MDM: CPT | Mod: 25

## 2017-02-19 RX ORDER — FUROSEMIDE 10 MG/ML
60 INJECTION INTRAMUSCULAR; INTRAVENOUS
Status: COMPLETED | OUTPATIENT
Start: 2017-02-19 | End: 2017-02-19

## 2017-02-19 RX ORDER — FAMOTIDINE 10 MG/ML
20 INJECTION INTRAVENOUS EVERY 12 HOURS
Status: DISCONTINUED | OUTPATIENT
Start: 2017-02-19 | End: 2017-02-20

## 2017-02-19 RX ORDER — ENOXAPARIN SODIUM 100 MG/ML
40 INJECTION SUBCUTANEOUS EVERY 24 HOURS
Status: DISCONTINUED | OUTPATIENT
Start: 2017-02-19 | End: 2017-02-20 | Stop reason: HOSPADM

## 2017-02-19 RX ORDER — LISINOPRIL 5 MG/1
5 TABLET ORAL DAILY
Status: DISCONTINUED | OUTPATIENT
Start: 2017-02-19 | End: 2017-02-20 | Stop reason: HOSPADM

## 2017-02-19 RX ORDER — NAPROXEN SODIUM 220 MG/1
81 TABLET, FILM COATED ORAL DAILY
Status: DISCONTINUED | OUTPATIENT
Start: 2017-02-19 | End: 2017-02-20 | Stop reason: HOSPADM

## 2017-02-19 RX ORDER — FUROSEMIDE 10 MG/ML
60 INJECTION INTRAMUSCULAR; INTRAVENOUS 2 TIMES DAILY
Status: DISCONTINUED | OUTPATIENT
Start: 2017-02-19 | End: 2017-02-20 | Stop reason: HOSPADM

## 2017-02-19 RX ORDER — MOXIFLOXACIN HYDROCHLORIDE 400 MG/250ML
400 INJECTION, SOLUTION INTRAVENOUS
Status: COMPLETED | OUTPATIENT
Start: 2017-02-19 | End: 2017-02-19

## 2017-02-19 RX ORDER — ONDANSETRON 2 MG/ML
4 INJECTION INTRAMUSCULAR; INTRAVENOUS EVERY 12 HOURS PRN
Status: DISCONTINUED | OUTPATIENT
Start: 2017-02-19 | End: 2017-02-20 | Stop reason: HOSPADM

## 2017-02-19 RX ORDER — FUROSEMIDE 10 MG/ML
60 INJECTION INTRAMUSCULAR; INTRAVENOUS 2 TIMES DAILY
Status: DISCONTINUED | OUTPATIENT
Start: 2017-02-19 | End: 2017-02-19

## 2017-02-19 RX ORDER — IBUPROFEN 200 MG
24 TABLET ORAL
Status: DISCONTINUED | OUTPATIENT
Start: 2017-02-19 | End: 2017-02-20 | Stop reason: HOSPADM

## 2017-02-19 RX ORDER — DIAZEPAM 5 MG/1
5 TABLET ORAL EVERY 12 HOURS
Status: DISCONTINUED | OUTPATIENT
Start: 2017-02-19 | End: 2017-02-20 | Stop reason: HOSPADM

## 2017-02-19 RX ORDER — IBUPROFEN 200 MG
16 TABLET ORAL
Status: DISCONTINUED | OUTPATIENT
Start: 2017-02-19 | End: 2017-02-20 | Stop reason: HOSPADM

## 2017-02-19 RX ORDER — POTASSIUM CHLORIDE 20 MEQ/1
40 TABLET, EXTENDED RELEASE ORAL ONCE
Status: COMPLETED | OUTPATIENT
Start: 2017-02-19 | End: 2017-02-19

## 2017-02-19 RX ORDER — HYDRALAZINE HYDROCHLORIDE 20 MG/ML
20 INJECTION INTRAMUSCULAR; INTRAVENOUS EVERY 6 HOURS PRN
Status: DISCONTINUED | OUTPATIENT
Start: 2017-02-19 | End: 2017-02-20 | Stop reason: HOSPADM

## 2017-02-19 RX ORDER — NIFEDIPINE 30 MG/1
30 TABLET, EXTENDED RELEASE ORAL DAILY
Status: DISCONTINUED | OUTPATIENT
Start: 2017-02-19 | End: 2017-02-20 | Stop reason: HOSPADM

## 2017-02-19 RX ORDER — GLUCAGON 1 MG
1 KIT INJECTION
Status: DISCONTINUED | OUTPATIENT
Start: 2017-02-19 | End: 2017-02-20 | Stop reason: HOSPADM

## 2017-02-19 RX ADMIN — MOXIFLOXACIN HYDROCHLORIDE 400 MG: 400 INJECTION, SOLUTION INTRAVENOUS at 10:02

## 2017-02-19 RX ADMIN — DIAZEPAM 5 MG: 5 TABLET ORAL at 08:02

## 2017-02-19 RX ADMIN — LISINOPRIL 5 MG: 5 TABLET ORAL at 03:02

## 2017-02-19 RX ADMIN — POTASSIUM CHLORIDE 40 MEQ: 20 TABLET, EXTENDED RELEASE ORAL at 01:02

## 2017-02-19 RX ADMIN — FUROSEMIDE 60 MG: 10 INJECTION, SOLUTION INTRAMUSCULAR; INTRAVENOUS at 05:02

## 2017-02-19 RX ADMIN — FAMOTIDINE 20 MG: 10 INJECTION INTRAVENOUS at 08:02

## 2017-02-19 RX ADMIN — ASPIRIN 81 MG 81 MG: 81 TABLET ORAL at 12:02

## 2017-02-19 RX ADMIN — FUROSEMIDE 60 MG: 10 INJECTION, SOLUTION INTRAMUSCULAR; INTRAVENOUS at 09:02

## 2017-02-19 RX ADMIN — IOHEXOL 100 ML: 350 INJECTION, SOLUTION INTRAVENOUS at 11:02

## 2017-02-19 RX ADMIN — ENOXAPARIN SODIUM 40 MG: 100 INJECTION SUBCUTANEOUS at 03:02

## 2017-02-19 RX ADMIN — NIFEDIPINE 30 MG: 30 TABLET, FILM COATED, EXTENDED RELEASE ORAL at 03:02

## 2017-02-19 NOTE — IP AVS SNAPSHOT
[facility]  [address]  [place] [address]  Phone: [phone]           Instrucciones de Jacquelin de Pacientes    Nuestro objetivo es que te prepara para el éxito. Jennifer paquete incluye información sobre richmond enfermedad, medicamentos y atención médica a domicilio. Etta le ayudará a cuidar y que no se enferman más y necesita volver al hospital.     Por favor, pregunte a richmond enfermera si tiene alguna pregunta.       Hay muchos detalles a recordar cuando se prepara para salir del hospital. Etta es lo que necesita hacer:    1. [facility] [place] medicina. Si usted tiene elizabeth receta, revise la lista de medicamentos en las siguientes páginas. Es posible que tenga nuevos medicamentos para recoger en la farmacia y otros que tendrá que dejar de alba. Revise las instrucciones sobre cómo y cuándo alba lamont medicamentos. Consulte con el médico o el enfermeras si no está seguro de qué hacer.    2. Ir a lamont citas de seguimiento. La información específica de seguimiento aparece en las siguientes páginas. Usted puede ser contactado por elizabeth enfermera o proveedor clínico sobre las próximas citas. Estar seguro de que tiene todos los números de teléfono para comunicarse si es necesario. Por favor, póngase en contacto con la oficina de richmond profesional médico si no puede hacer elizabeth dmitry.     3. Esté atento a señales de advertencia. El médico o la enfermera le dará señales de alarma detallados que debe observar y cuándo llamar para la ayuda. Estas instrucciones también pueden incluir información educativa acerca de richmond condición. Si usted experimenta cualquiera de las señales de advertencia para richmond margarita, llame a richmond médico.           ** Verificar la lista de medicamentos es correcta y está actualizada. Llevar esto con usted en xochilt de emergencia. Si laomnt medicamentos moralez cambiado, por favor notifique a richmond proveedor de atención médica.             Lista de medicamentos      EMPIECE a alba estos medicamentos        Additional Info                       * amiodarone 400 MG tablet   También conocido jeremias:  PACERONE   Cantidad:  14 tablet   Recargas:  0   Dosis:  400 mg    Instrucciones:  Take 1 tablet (400 mg total) by mouth 2 (two) times daily.     Begin Date    AM    Noon    PM    Bedtime       * amiodarone 200 MG Tab   También conocido jeremias:  PACERONE   Cantidad:  180 tablet   Recargas:  1   Dosis:  200 mg    Fecha de inicio:  2/27/2017   Instrucciones:  Take 1 tablet (200 mg total) by mouth 2 (two) times daily.     Begin Date    AM    Noon    PM    Bedtime       spironolactone 25 MG tablet   También conocido jeremias:  ALDACTONE   Cantidad:  90 tablet   Recargas:  1   Dosis:  25 mg    Instrucciones:  Take 1 tablet (25 mg total) by mouth once daily.     Begin Date    AM    Noon    PM    Bedtime       * Aviso:  Esta lista contiene medicamentos que son iguales a otros medicamentos recetados para usted. Christine las instrucciones con cuidado y pida a richmond personal médico que revise la lista de medicamentos y las instrucciones correspondientes con usted.      CAMBIE la forma de alba estos medicamentos        Additional Info                      carvedilol 3.125 MG tablet   También conocido jeremias:  COREG   Cantidad:  180 tablet   Recargas:  1   Dosis:  3.125 mg   Qué cambió:    - concentración del medicamento  - dosis a alba    Instrucciones:  Take 1 tablet (3.125 mg total) by mouth 2 (two) times daily.     Begin Date    AM    Noon    PM    Bedtime       lisinopril 10 MG tablet   Cantidad:  90 tablet   Recargas:  1   Dosis:  10 mg   Qué cambió:    - concentración del medicamento  - dosis a alba    Última administración:  5 mg on 2/20/2017  8:28 AM   Instrucciones:  Take 1 tablet (10 mg total) by mouth once daily.     Begin Date    AM    Noon    PM    Bedtime         SIGA tomando estos medicamentos        Additional Info                      aspirin 81 MG Chew   Recargas:  0   Dosis:  81 mg    Última administración:  81 mg on 2/20/2017  8:28 AM   Instrucciones:   Take 1 tablet (81 mg total) by mouth once daily.     Begin Date    AM    Noon    PM    Bedtime       diazePAM 5 MG tablet   También conocido jeremias:  VALIUM   Cantidad:  20 tablet   Recargas:  0   Dosis:  5 mg    Última administración:  5 mg on 2/20/2017  8:28 AM   Instrucciones:  Take 1 tablet (5 mg total) by mouth every 8 (eight) hours as needed (muscle spasm).     Begin Date    AM    Noon    PM    Bedtime       fluticasone 50 mcg/actuation nasal spray   También conocido jeremias:  FLONASE   Cantidad:  15 g   Recargas:  0   Dosis:  1 spray    Instrucciones:  1 spray by Each Nare route 2 (two) times daily as needed.     Begin Date    AM    Noon    PM    Bedtime       furosemide 40 MG tablet   También conocido jeremias:  LASIX   Cantidad:  60 tablet   Recargas:  1   Dosis:  40 mg    Instrucciones:  Take 1 tablet (40 mg total) by mouth 2 (two) times daily.     Begin Date    AM    Noon    PM    Bedtime       ibuprofen 800 MG tablet   También conocido jeremias:  ADVIL,MOTRIN   Cantidad:  90 tablet   Recargas:  0   Dosis:  800 mg    Instrucciones:  Take 1 tablet (800 mg total) by mouth 3 (three) times daily.     Begin Date    AM    Noon    PM    Bedtime       oxycodone-acetaminophen 5-325 mg per tablet   También conocido jeremias:  PERCOCET   Cantidad:  20 tablet   Recargas:  0   Dosis:  1 tablet    Instrucciones:  Take 1 tablet by mouth every 6 (six) hours as needed for Pain.     Begin Date    AM    Noon    PM    Bedtime         DEJE de alba estos medicamentos     potassium chloride 20 mEq   También conocido jeremias:  K-TAB            Dónde puede recoger lamont medicamentos      Puede obtener estos medicamentos en cualquier farmacia     Traiga elizabeth receta en papel para cada kaitlynn de estos medicamentos     amiodarone 200 MG Tab    amiodarone 400 MG tablet    carvedilol 3.125 MG tablet    furosemide 40 MG tablet    lisinopril 10 MG tablet    spironolactone 25 MG tablet                  Por favor traiga a todos las citas de seguimiento:    1. Elizabeth  copia de las instrucciones de fili.  2. Todos los medicamentos que está tomando kristen momento, en lamont envases originales.  3. Identificación y tarjeta de seguro.    Por favor llegue 15 minutos antes de la hora de la dmitry.    Por favor llame con 24 horas de antelación si tiene que cambiar richmond dmitry y / o tiempo.        Información de seguimiento     Realice un seguimiento con:  Jose Fierro MD    Cómo:  Grace elizabeth dmitry lo antes posible    Cuándo:  2/27/2017    Especialidad:  Family Medicine    Por qué:  for hospital follow-up    Información de contacto:    [address]   [address] [place] [address]  [phone]          Realice un seguimiento con:  Peggy Childs MD    Cómo:  Grace elizabeth dmitry lo antes posible    Cuándo:  2/27/2017    Especialidad:  Cardiology    Por qué:  to see Cardiologist at [facility] in 1 week    Información de contacto:    [address] [place] [address] [place] [address]  [phone]          Instrucciones de fili     Órdenes Futuras    Activity as tolerated     Call MD for:  difficulty breathing or increased cough     Call MD for:  increased confusion or weakness     Call MD for:  persistent dizziness, light-headedness, or visual disturbances     Call MD for:  persistent nausea and vomiting or diarrhea     Call MD for:  redness, tenderness, or signs of infection (pain, swelling, redness, odor or green/yellow discharge around incision site)     Call MD for:  severe persistent headache     Call MD for:  severe uncontrolled pain     Call MD for:  temperature >100.4     Call MD for:  worsening rash     Diet general     Preguntas:    Total calories:      Fat restriction, if any:      Protein restriction, if any:      Na restriction, if any:      Fluid restriction:  Fluid - 1500mL    Additional restrictions:  Cardiac (Low Na/Chol)      Referencias/Adjuntos de fili     HEART FAILURE, DISCHARGE INSTRUCTIONS FOR (English)    HEART FAILURE, WHAT IS (English)    HEART FAILURE: MAKING CHANGES TO YOUR DIET  "(Swazi)    HEART FAILURE: TRACKING YOUR WEIGHT (Swazi)    HEART FAILURE: WARNING SIGNS OF A FLARE-UP (Swazi)    AMIODARONE TABLETS (Swazi)    SPIRONOLACTONE TABLETS (Swazi)        Primary Diagnosis     [place] diagnosis primaria es:  Heart Failure      Información de Admisión     Fecha y hora Proveedor Departamento [facility]    2/19/2017  7:02 AM Gary Galvez III, MD [facility] [id number]      Los proveedores de cuidado     Personal Médico Rol Especialidad Teléfono principal de la oficina    Gary Galvez III, MD Attending Provider Family Medicine [phone]    Gary Galvez III, MD Team Attending  Family Medicine  [phone]      Misty signos vitales abdirahman     PS Pulso Temperatura Resp [place] Peso    122/76 (BP Location: Left arm, Patient Position: Sitting, BP Method: Automatic) 97 97.7 °F (36.5 °C) (Oral) 20 6' 2" (1.88 m) 90.7 kg (200 lb)    SpO2 BMI (C)                97% 25.68 kg/m2          Recent Lab Values     No lab values to display.      Pending Labs     Order Current Status    Blood culture #1 **CANNOT BE ORDERED STAT** Preliminary result    Blood culture #2 **CANNOT BE ORDERED STAT** Preliminary result      Alergias     A partir del:  2/20/2017        No Known Allergies      [facility] On [facility]     [facility] - 24/7    Si richmond médico no le ha indicado a lo contrário, por favor contactar a [facility], nuestra línea de cuidado de enfermeras está disponible para asistirle 24/7.    Enfermeras registradas de [facility] pueden ayudarle a reservar elizabeth dmitry, proveer educación para la margraita, asesoría clínica, y otros servicios de asesoramiento.     Llame para kristen servicio gratuito a [phone].        Directiva Anticipada     Elizabeth directiva anticipada es un documento que, en xochilt de que ya no capaz de hacer decisiones por sí mismo, le dice a richmond equipo médico qué tipo de tratamiento quiere o no quiere recibir, o que le gustaría alba esas decisiones para usted . Si actualmente no tiene " elizabeth directiva anticipada, [facility] le anima a crear kaitlynn. Para más información llame al: [phone] ([phone]), o entrando en [email or web address].        Language Assistance Services     ATTENTION: Language assistance services are available, free of charge. Please call [phone].      ATENCIÓN: Si habla español, tiene a richmond disposición servicios gratuitos de asistencia lingüística. Llame al [phone].     CHÚ Ý: N?u b?n nói Ti?ng Vi?t, có các d?ch v? h? tr? ngôn ng? mi?n phí dành cho b?n. G?i s? [phone].        Educación para insuficiencia cardíaca       Insuficiencia cardíaca: cómo mantenerse activo  Usted tiene un trastorno llamado insuficiencia cardíaca (también llamado insuficiencia cardíaca congestiva). Mantenerse activo no quiere decir que tenga que agotarse haciendo ejercicio. Incluso un poco de movimiento cada día ayuda a fortalecer el corazón. Si no puede salir a hacer ejercicio, agus ejercicios simples de estiramiento y fortalecimiento en richmond casa.  Ideas para comenzar  · Añada un poco de movimiento a las actividades rutinarias. Camine para poner elizabeth carta en el buzón. Deje el auto en el extremo más alejado del estacionamiento y camine a la maría.  · Elija actividades que le gusten. Puede caminar, nadar o pedalear en elizabeth bicicleta estacionaria. Las actividades jeremias el cuidado del jardín y el lavado del automóvil también cuentan.  · Participe en un devon de ejercicio en la [employer] o la [employer], o en un centro comunitario o de jubilados, o pietro en un programa de rehabilitación cardíaca en un hospital.  Consejos para continuar la actividad  · Levántese y vístase todos los días. [place] le dará más motivación para hacer cosas.  · Establezca un plan. Elija elizabeth o más actividades que le gusten y que pueda hacer fácilmente, y planifique hacer al menos elizabeth cada día. Antelmo vez le sirva de ayuda escribir el plan en un calendario.  · Vaya con un amigo o un devon si le gusta  tener compañía. De esta manera, se sentirá también más motivado.  Para richmond seguridad  · Planifique lamont actividades según la estación y el clima. Si hace demasiado calor, humedad, frío o mal tiempo, agus ejercicio en un lugar protegido de la intemperie. Pruebe a caminar dentro de un centro de compras cerrado.  · Use calcetines y calzado firme.  · Comience despacio. Al principio, agus algunos minutos de ejercicio varias veces al día. Vaya aumentando gradualmente la duración y el ritmo del ejercicio.  · Pare el ejercicio si se siente cansado o tiene dificultad para respirar.  · No se fuerce demasiado cuando no se sienta pietro.  Date Last Reviewed: 3/20/2016  © 2660-8178 [employer]. [address] [place] [address]. Todos los derechos reservados. Esta información no pretende sustituir la atención médica profesional. Sólo richmond médico puede diagnosticar y tratar un problema de margarita.              Insuficiencia cardíaca: La evaluación de richmond corazón  Usted tiene un trastorno llamado insuficiencia cardíaca (también llamado insuficiencia cardíaca congestiva). Para evaluar richmond afección, el médico lo examinará, le hará preguntas y realizará unas pruebas. Además de buscar indicios de insuficiencia cardíaca, el médico determinará si usted tiene otros problemas de margarita que podrían haberle causado esta afección. Los resultados de richmond evaluación ayudarán al médico a elaborar richmond plan de tratamiento.  Historia clínica y chequeo  [place]mond visita comenzará con elizabeth historia clínica. Mencione al médico todos los síntomas que haya tenido y los medicamentos que varun. A continuación le harán un chequeo que incluirá escucharle los latidos del corazón y la respiración. También lo examinarán para anu si usted tiene edema (hinchazón).  Diagnóstico de la insuficiencia cardíaca     Beckie un ecocardiograma, las ondas de david que rebotan contra el corazón se convierten en elizabeth imagen visible en la pantalla.   Podrían hacerle kaitlynn o  varios de los siguientes exámenes, que ayudan a richmond médico a llegar a un diagnóstico:  · Radiografías: estas imágenes revelan el tamaño y la forma de richmond corazón, y también pueden mostrar si usted tiene líquido en los pulmones.  · Electrocardiograma (abreviado ECG o EKG): registra el patrón de los latidos del corazón. En el pecho, los brazos y las piernas le pegarán unas pequeñas almohadillas (electrodos) conectadas mediante alambres a la máquina de ECG, que registra las señales eléctricas del corazón.  · Ecocardiograma: se usan ondas de ultrasonido para revelar la estructura y el movimiento del músculo cardíaco. Jennifer examen permite determinar si el corazón bombea con eficiencia y si ha aumentado de tamaño, así jeremias el grosor de las [place] cardíacas.  · Análisis de laboratorio: evalúan pequeñas muestras de divina u orina para anu si hay algún problema.  Richmond plan de tratamiento  A partir de los resultados de richmond evaluación y exámenes, el médico desarrollará richmond plan de tratamiento. Jennifer plan, que está diseñado para aliviar algunos de los síntomas de richmond insuficiencia cardíaca y reducir lamont molestias, puede incluir:  · Medicamentos que aumentan la eficiencia del corazón y mejoran richmond calidad de fariba.  · Modificaciones en lo que usted come y betsey para prevenir la acumulación de líquido en richmond cuerpo.  · Vigilancia diaria de richmond peso y lamont síntomas de insuficiencia cardíaca para determinar hasta qué punto está surtiendo efecto el plan de tratamiento.  · Ejercicios que le ayuden a conservar richmond margarita.  · Apoyo para dejar de fumar.  Date Last Reviewed: 3/21/2016  © 0968-2250 [employer], [employer] [address] [place] [address], [place] [address]. Todos los derechos reservados. Esta información no pretende sustituir la atención médica profesional. Sólo richmond médico puede diagnosticar y tratar un problema de margarita.              Insuficiencia cardíaca: Cambios en la dieta  Usted tiene un trastorno llamado insuficiencia cardíaca (también  llamado insuficiencia cardíaca congestiva). Si usted tiene insuficiencia cardíaca, es más probable que se acumule líquido en richmond cuerpo. Esta acumulación hace que richmond corazón tenga que trabajar más fatmata para bombear la divina, y además provoca síntomas jeremias la falta de aliento y el edema (hinchazón). Controlar la cantidad de sal (sodio) que usted come puede ayudar a prevenir la acumulación de líquido; además, richmond médico podría indicarle que disminuya la cantidad de líquidos que usted betsey.  Christine las etiquetas de los alimentos  Leer las etiquetas de los alimentos le ayuda a llevar la cuenta de la cantidad de rosario que ingiere. Tenga en mente que los alimentos enlatados, congelados y procesados pueden contener mucha rosario; revise la cantidad de rosario presente en cada porción. Además, tenga cuidado con ciertos ingredientes ricos en sodio jeremias el MSG (glutamato monosódico) y el polvo de hornear, que contiene bicarbonato y fosfato de sodio. [place] proveedor de atención médica le dirá cuánta rosario puede comer al día.  Coma menos sal  Dése el tiempo suficiente para acostumbrarse a comer menos sal; quizás tarde un poco, jeaneth richmond corazón se lo merece. He aquí algunos consejos que pueden ayudarle:  · Quite el salero de la costa y sustitúyalo por mezclas de hierbas y especias sin sal.  · Coma verduras frescas, o congeladas sin condimentos, porque contienen mucha menos rosario que las verduras enlatadas.  · Coma bocadillos con poco sodio jeremias las versiones sin sal de pretzels, galletas o palomitas de maíz cocidas con aire caliente.  · En vez de agregar sal a los alimentos que cocina, sazónelos con bony, donnell, ajo o cebolla.  · Cuando salga a comer, solicite que le preparen lamont platos sin agregarles sal.  Si le dicen que limite richmond consumo de líquido  Quizás sea necesario que limite richmond consumo de líquido para prevenir el edema. Se considera líquido a todo alimento que no es sólido a temperatura ambiente, lo cual incluye a los helados y las  sopas. Si richmond médico le indica que limite los líquidos, pruebe a seguir estos consejos:  · Para cumplir con lamont objetivos diarios, mida los líquidos en elizabeth taza de medir antes de tomarlos.  · Enfríe pietro las bebidas para hacerlas más refrescantes.  · Chupe rodajas de donnell congelado para calmar la sed.  · Reena sólo cuando tenga sed.  · Mastique chicle sin azúcar o chupe caramelos duros para mantener húmeda la boca.  Cuándo llamar a richmond médico  Llame a richmond médico inmediatamente si tiene alguna señal de que richmond insuficiencia cardíaca está empeorando, incluyendo un aumento repentino de peso, elizabeth mayor hinchazón en las piernas o los tobillos, o elizabeth mayor dificultad para respirar cuando esté en reposo o por la noche durmiendo.  Date Last Reviewed: 3/21/2016  © 2120-7133 [employer]. [address] [place] [address]. Todos los derechos reservados. Esta información no pretende sustituir la atención médica profesional. Sólo richmond médico puede diagnosticar y tratar un problema de margarita.              Insuficiencia cardíaca: Medicamentos que ayudan a richmond corazón  Usted tiene un trastorno llamado insuficiencia cardíaca (también llamado insuficiencia cardíaca congestiva). Es probable que richmond médico le recete medicamentos para tratar richmond insuficiencia cardíaca y los problemas de margarita que la hayan causado. La mayoría de los pacientes con insuficiencia cardíaca michael kaitlynn o más tipos de medicamentos. [place] proveedor de atención médica trabajará para encontrar la combinación de medicamentos que mejor le resulte.     Lleve todos lamont medicamentos cuando vaya a lamont citas médicas para poder hablar sobre ellos con richmond proveedor de atención médica.   Medicamentos para tratar la insuficiencia cardíaca  A continuación se describen los medicamentos usados con más frecuencia para tratar la insuficiencia cardíaca.  · Inhibidores de la ECA: bajan la presión arterial y disminuyen el esfuerzo que debe hacer el corazón, con lo cual se  facilita el bombeo de la divina. Los antagonistas de los receptores de la angiotensina tienen efectos similares, y se recetan a algunos pacientes en vez de los inhibidores de la ECA.  · Betabloqueantes: Los betabloqueantes alivian la sobrecarga del corazón, así jeremias también los síntomas. Además es posible que, con el tiempo, mejoren la función de bombeo del corazón.  · Diuréticos: ayudan al cuerpo a deshacerse del exceso de agua y, en consecuencia, a reducir el edema (hinchazón). Si el corazón requiere bombear elizabeth latoya cantidad de líquido, no tiene que hacer tanto esfuerzo. Algunos diuréticos hacen que el cuerpo pierda un mineral denominado potasio. [place] médico le dirá si usted tiene que alba suplementos o comer más alimentos ricos en potasio.  · Digoxina: ayuda al corazón a bombear con más fuerza, es decir, a impulsar más divina con cada latido. De esta forma, se envía elizabeth mayor cantidad de divina kalpesh en oxígeno al justus del cuerpo.  · Los antagonistas de aldosterona ayudan a alterar las hormonas y disminuyen el esfuerzo que tiene que hacer el corazón.  · La hidralazina y los nitratos son dos medicamentos diferentes que se usan juntos para tratar la insuficiencia cardíaca. Pueden venir combinados en elizabeth piedad pastilla, y richmond función es reducir la presión arterial y la fuerza con la que tiene que bombear divina el corazón.  Medicamentos para tratar afecciones relacionadas  El tratamiento de otros problemas del corazón puede ayudar a controlar también la insuficiencia cardíaca. Dependiendo de los otros trastornos cardíacos que tenga usted, podrían recetarle medicamentos que:  · Bajen la presión arterial (antihipertensivos).  · Disminuyan los niveles de colesterol (estatinas).  · Prevengan la formación de coágulos de divina (anticoagulantes o aspirina).  · Normalicen el ritmo cardíaco (antiarrítmicos).  Date Last Reviewed: 3/5/2016  © 5552-4361 [employer], [employer]. [address] [place] [address], [place] [address].  Todos los derechos reservados. Esta información no pretende sustituir la atención médica profesional. Sólo richmond médico puede diagnosticar y tratar un problema de margarita.              Insuficiencia cardíaca: procedimientos para mejorarla  Usted tiene un trastorno llamado insuficiencia cardíaca (también llamado insuficiencia cardíaca congestiva). Ciertos procedimientos, efectuados para tratar problemas de margarita que afectan el corazón, pueden mejorar la insuficiencia cardíaca en algunos casos. Sin embargo, estos procedimientos no son las opciones óptimas para todos los pacientes. Si alguno de ellos puede beneficiar richmond margarita, richmond médico puede darle más detalles.    Tratamiento de problemas en las arterias y las válvulas  Si usted tiene enfermedad de las arterias coronarias o de las válvulas cardíacas, podrían hacerle alguno de los procedimientos que aumentan la circulación sanguínea. [place] permite al corazón bombear con más eficiencia, lo cual puede mejorar los síntomas de insuficiencia cardíaca.  · El cateterismo cardíaco permite detectar vasos sanguíneos obstruidos. A través de un catéter (tubo henderson) se inyecta un colorante para radiografías en el corazón; luego se varun elizabeth angiografía (un tipo de radiografía especial) de los vasos sanguíneos.  · La angioplastia y la colocación de stents son procedimientos que se efectúan lupe el cateterismo cardíaco para ensanchar las arterias estrechadas.  · La cirugía de estes coronario (bypass) permite que la divina circule alrededor de elizabeth arteria obstruida.  · La cirugía valvular repara o reemplaza las válvulas defectuosas para que la divina circule debidamente por las cámaras del corazón.  Tratamiento de los trastornos del ritmo cardíaco  Pueden conectarse unos dispositivos al corazón para normalizar un ritmo cardíaco lento o anormal. De esta forma, se niko el esfuerzo que debe hacer el corazón.  · El marcapasos es un dispositivo electrónico pequeño que se usa para tratar  un ritmo cardíaco demasiado lento.  · El desfibrilador cardioversor implantable (ICD, por lamont siglas en inglés) es un dispositivo usado para tratar ritmos cardíacos rápidos cuando ponen en peligro la fariba de la persona.  · La terapia de resincronización cardiaca (CRT, por lamont siglas en inglés) es un tratamiento que estimula las cavidades inferiores del corazón (ventrículos) para que lamont contracciones katrina más eficientes. Así, se bombea más divina con cada latido. Esta terapia se lleva a cabo por medio de un marcapasos.  En casos graves  Para algunas personas que están muy enfermas, puede existir la opción de un trasplante cardíaco. Se trata de elizabeth operación sumamente seria que no está a la disposición de todos los pacientes. [place] médico puede darle más información a kristen respecto.  Date Last Reviewed: 3/20/2016  © 0112-0720 [employer]. [address] [place] [address]. Todos los derechos reservados. Esta información no pretende sustituir la atención médica profesional. Sólo richmond médico puede diagnosticar y tratar un problema de margarita.              Insuficiencia cardíaca: cómo vigilar richmond peso  Usted tiene un trastorno llamado insuficiencia cardíaca (también llamado insuficiencia cardíaca congestiva). Cuando usted tiene insuficiencia cardíaca un aumento de peso, pietro sea repentino o semaj, es señal de que el cuerpo puede estar reteniendo demasiada agua y sal, lo cual puede significar que richmond insuficiencia cardíaca está empeorando. Pesarse todos los makayla es la mejor manera de averiguar si está reteniendo agua. Si richmond peso aumenta rápidamente, llame al médico para que le dé instrucciones sobre cómo eliminar el exceso de agua. [place] ayudará a que richmond corazón trabaje mejor.  Llame a richmond médico si aumenta más de 2 libras [1 kg] en un día, más de 5 libras [3 kg] en elizabeth semana, o el aumento de peso que richmond médico le dijo que debía informar. [place] suele ser un signo de que richomnd insuficiencia cardíaca está  empeorando. [place] médico le dirá qué debe hacer a continuación.     Consejos para pesarse  · Pésese a la misma hora cada mañana, y con la misma cantidad de ropa. Pésese después de orinar y antes de comer.  · Utilice siempre la misma balanza. Póngala en elizabeth superficie plana y firme, no sobre elizabeth alfombra.  · No deje de pesarse. Si se olvida de hacerlo un día, pésese de nuevo al día siguiente por la mañana.  Cómo usar la hoja de registro de peso  · Mantenga la hoja de registro de peso cerca de la balanza. Anote richmond peso en la hoja tan pronto jeremias termine de pesarse.  · Escriba el mes y la fecha de inicio en la hoja de registro de peso. Anote richmond peso todos los días. Jennifer es un ejemplo de elizabeth hoja de registro de peso:    · Si se olvida de hacer elizabeth anotación, deje el espacio en ty para fausto día. Pésese al día siguiente y anote el peso en el espacio correspondiente.  · Lleve la hoja de registro de peso cuando vaya a anu al médico.  Date Last Reviewed: 3/20/2016  © 9623-5009 [employer]. [address] [place] [address]. Todos los derechos reservados. Esta información no pretende sustituir la atención médica profesional. Sólo richmond médico puede diagnosticar y tratar un problema de margarita.              Insuficiencia cardíaca: señales de elizabeth recaída  Usted tiene un trastorno llamado insuficiencia cardíaca (también llamado insuficiencia cardíaca congestiva). Elizabeth vez que ha tenido elizabeth insuficiencia cardíaca, las recaídas son posibles. A continuación encontrará las señales que pueden indicarle que elizabeth insuficiencia cardíaca está empeorando. Si detecta algunas de estas señales, llame a richmond proveedor de atención médica.    Hinchazón  · Se le hinchan los tobillos o la parte inferior de las piernas.  · Los zapatos le quedan demasiado apretados.  · La ropa le queda demasiado apretada en la cintura.  · Tiene dificultad para ponerse o quitarse un anillo.  Dificultad para respirar  · Se ve obligado a respirar más  keon incluso cuando está realizando lamont actividades normales o descansando.  · Se despierta por la noche con dificultad para respirar o tosiendo.  · Necesita poner más almohadas o sentarse para dormir.  Otras señales de aviso  · Se siente más débil, mareado o cansado.  · Siente dolor en el pecho o tiene cambios bruscos en el ritmo cardíaco.  · Tiene elizabeth tos que no desaparece.  · Tiene dificultades para recordar cosas o pérdida de apetito.  Vigile richmond peso  El aumento de peso suele ser el primer signo de advertencia de que la insuficiencia cardíaca está empeorando. Incluso aumentar unas pocas libras puede ser signo de que richmond cuerpo está reteniendo un exceso de agua y sal. Pesarse todos los días es la mejor manera de saber si está reteniendo agua. [place] proveedor de atención médica le mostrará cómo hacer un seguimiento de richmond peso. Llame a richmond médico si aumenta más de 2 libras [1 kg] en un día, 5 libras [3 kg] en elizabeth semana, o el aumento de peso que richmond médico le dijo que debía informar. [place] suele ser un signo de que richmond insuficiencia cardíaca está empeorando. [place] médico le dirá qué hacer a continuación.  Date Last Reviewed: 3/15/2016  © 4719-8906 [employer]. [address] [place] [address], [place] [address]. Todos los derechos reservados. Esta información no pretende sustituir la atención médica profesional. Sólo richmond médico puede diagnosticar y tratar un problema de margarita.              Registrarse para [facility]     La activación de richmond cuenta [facility] es tan fácil jeremias 1-2-3!    1) Ir a [email or web address], seleccione Registrarse Ahora, meter el código de activación y richmond fecha de nacimiento, y seleccione Próximo.    [id number]  Expires: 3/26/2017  7:42 AM      2) Crear un nombre de usuario y contraseña para usar cuando se visita [facility] en el futuro y selecciona elizabeth pregunta de seguridad en xochilt de que pierda richmond contraseña y seleccione Próximo.    3) Introduzca richmond dirección de correo electrónico y agus clic en  Registrarse!    Información Adicional  Si tiene alguna pregunta, por favor, e-mail [email or web address] o lldianne al [phone] para hablar con nuestro personal. Recuerde, [facility] no debe ser usada para necesidades urgentes. En xochilt de emergencia médica, llame al 911.         [facility] cumple con las leyes federales aplicables de derechos civiles y no discrimina por motivos de yesenia, color, origen nacional, edad, discapacidad, o sexo.                        \[facility] [place] [facility]\""  [address]  [place] [address]  Phone: [phone]           Patient Discharge Instructions     Our goal is to set you up for success. This packet includes information on your condition, medications, and your home care. It will help you to care for yourself so you don't get sicker and need to go back to the hospital.     Please ask your nurse if you have any questions.        There are many details to remember when preparing to leave the hospital. Here is what you will need to do:    1. Take your medicine. If you are prescribed medications, review your Medication List in the following pages. You may have new medications to  at the pharmacy and others that you'll need to stop taking. Review the instructions for how and when to take your medications. Talk with your doctor or nurses if you are unsure of what to do.     2. Go to your follow-up appointments. Specific follow-up information is listed in the following pages. Your may be contacted by a transition nurse or clinical provider about future appointments. Be sure we have all of the phone numbers to reach you, if needed. Please contact your provider's office if you are unable to make an appointment.     3. Watch for warning signs. Your doctor or nurse will give you detailed warning signs to watch for and when to call for assistance. These instructions may also include educational information about your condition. If you experience any of warning signs to your  health, call your doctor.           ** Verificar la lista de medicamentos es correcta y está actualizada. Llevar esto con usted en xochilt de emergencia. Si lamont medicamentos moralez cambiado, por favor notifique a richmond proveedor de atención médica.             Medication List      START taking these medications        Additional Info                      * amiodarone 400 MG tablet   Commonly known as:  PACERONE   Quantity:  14 tablet   Refills:  0   Dose:  400 mg    Instructions:  Take 1 tablet (400 mg total) by mouth 2 (two) times daily.     Begin Date    AM    Noon    PM    Bedtime       * amiodarone 200 MG Tab   Commonly known as:  PACERONE   Quantity:  180 tablet   Refills:  1   Dose:  200 mg    Start Date:  2/27/2017   Instructions:  Take 1 tablet (200 mg total) by mouth 2 (two) times daily.     Begin Date    AM    Noon    PM    Bedtime       spironolactone 25 MG tablet   Commonly known as:  ALDACTONE   Quantity:  90 tablet   Refills:  1   Dose:  25 mg    Instructions:  Take 1 tablet (25 mg total) by mouth once daily.     Begin Date    AM    Noon    PM    Bedtime       * Notice:  This list has 2 medication(s) that are the same as other medications prescribed for you. Read the directions carefully, and ask your doctor or other care provider to review them with you.      CHANGE how you take these medications        Additional Info                      carvedilol 3.125 MG tablet   Commonly known as:  COREG   Quantity:  180 tablet   Refills:  1   Dose:  3.125 mg   What changed:    - medication strength  - how much to take    Instructions:  Take 1 tablet (3.125 mg total) by mouth 2 (two) times daily.     Begin Date    AM    Noon    PM    Bedtime       lisinopril 10 MG tablet   Quantity:  90 tablet   Refills:  1   Dose:  10 mg   What changed:    - medication strength  - how much to take    Last time this was given:  5 mg on 2/20/2017  8:28 AM   Instructions:  Take 1 tablet (10 mg total) by mouth once daily.     Begin Date     AM    Noon    PM    Bedtime         CONTINUE taking these medications        Additional Info                      aspirin 81 MG Chew   Refills:  0   Dose:  81 mg    Last time this was given:  81 mg on 2/20/2017  8:28 AM   Instructions:  Take 1 tablet (81 mg total) by mouth once daily.     Begin Date    AM    Noon    PM    Bedtime       diazePAM 5 MG tablet   Commonly known as:  VALIUM   Quantity:  20 tablet   Refills:  0   Dose:  5 mg    Last time this was given:  5 mg on 2/20/2017  8:28 AM   Instructions:  Take 1 tablet (5 mg total) by mouth every 8 (eight) hours as needed (muscle spasm).     Begin Date    AM    Noon    PM    Bedtime       fluticasone 50 mcg/actuation nasal spray   Commonly known as:  FLONASE   Quantity:  15 g   Refills:  0   Dose:  1 spray    Instructions:  1 spray by Each Nare route 2 (two) times daily as needed.     Begin Date    AM    Noon    PM    Bedtime       furosemide 40 MG tablet   Commonly known as:  LASIX   Quantity:  60 tablet   Refills:  1   Dose:  40 mg    Instructions:  Take 1 tablet (40 mg total) by mouth 2 (two) times daily.     Begin Date    AM    Noon    PM    Bedtime       ibuprofen 800 MG tablet   Commonly known as:  ADVIL,MOTRIN   Quantity:  90 tablet   Refills:  0   Dose:  800 mg    Instructions:  Take 1 tablet (800 mg total) by mouth 3 (three) times daily.     Begin Date    AM    Noon    PM    Bedtime       oxycodone-acetaminophen 5-325 mg per tablet   Commonly known as:  PERCOCET   Quantity:  20 tablet   Refills:  0   Dose:  1 tablet    Instructions:  Take 1 tablet by mouth every 6 (six) hours as needed for Pain.     Begin Date    AM    Noon    PM    Bedtime         STOP taking these medications     potassium chloride 20 mEq   Commonly known as:  K-TAB            Where to Get Your Medications      You can get these medications from any pharmacy     Bring a paper prescription for each of these medications     amiodarone 200 MG Tab    amiodarone 400 MG tablet    carvedilol  3.125 MG tablet    furosemide 40 MG tablet    lisinopril 10 MG tablet    spironolactone 25 MG tablet                  Por favor traiga a todos las citas de seguimiento:    1. Debbie copia de las instrucciones de fili.  2. Todos los medicamentos que está tomando kristen momento, en lamont envases originales.  3. Identificación y tarjeta de seguro.    Por favor llegue 15 minutos antes de la hora de la dmitry.    Por favor llame con 24 horas de antelación si tiene que cambiar richmond dmitry y / o tiempo.        Follow-up Information     Follow up with Jose Fierro MD. Schedule an appointment as soon as possible for a visit in 1 week.    Specialty:  Family Medicine    Why:  for hospital follow-up    Contact information:    [address]   [address] [place] [address] [place] [address] [phone]          Follow up with Peggy Childs MD. Schedule an appointment as soon as possible for a visit in 1 week.    Specialty:  Cardiology    Why:  to see Cardiologist at [facility] in 1 week    Contact information:    [address] [facility] [place] [address] [phone]          Discharge Instructions     Future Orders    Activity as tolerated     Call MD for:  difficulty breathing or increased cough     Call MD for:  increased confusion or weakness     Call MD for:  persistent dizziness, light-headedness, or visual disturbances     Call MD for:  persistent nausea and vomiting or diarrhea     Call MD for:  redness, tenderness, or signs of infection (pain, swelling, redness, odor or green/yellow discharge around incision site)     Call MD for:  severe persistent headache     Call MD for:  severe uncontrolled pain     Call MD for:  temperature >100.4     Call MD for:  worsening rash     Diet general     Questions:    Total calories:      Fat restriction, if any:      Protein restriction, if any:      Na restriction, if any:      Fluid restriction:  Fluid - 1500mL    Additional restrictions:  Cardiac (Low Na/Chol)      Discharge References/Attachments      "HEART FAILURE, DISCHARGE INSTRUCTIONS FOR ([place])    HEART FAILURE, WHAT IS ([place])    HEART FAILURE: MAKING CHANGES TO YOUR DIET ([place])    HEART FAILURE: TRACKING YOUR WEIGHT ([place])    HEART FAILURE: WARNING SIGNS OF A FLARE-UP ([place])    AMIODARONE TABLETS ([place])    SPIRONOLACTONE TABLETS ([place])        Primary Diagnosis     Your primary diagnosis was:  Heart Failure      Admission Information     Date & Time Provider Department CSN    2/19/2017  7:02 AM Gary Galvez III, MD [facility] [id number]      Care Providers     Provider Role Specialty Primary office phone    Gary Galvez III, MD Attending Provider Family Medicine [phone]    Gary Galvez III, MD Team Attending  Family Medicine  [phone]      Your Vitals Were     BP Pulse Temp Resp Height Weight    122/76 (BP Location: Left arm, Patient Position: Sitting, BP Method: Automatic) 97 97.7 °F (36.5 °C) (Oral) 20 6' 2" (1.88 m) 90.7 kg (200 lb)    SpO2 BMI                97% 25.68 kg/m2          Recent Lab Values     No lab values to display.      Pending Labs     Order Current Status    Blood culture #1 **CANNOT BE ORDERED STAT** Preliminary result    Blood culture #2 **CANNOT BE ORDERED STAT** Preliminary result      Allergies as of 2/20/2017     No Known Allergies      [facility]     [facility] Nurse Care Line - 24/7 Assistance  Unless otherwise directed by your provider, please contact [facility], our nurse care line that is available for 24/7 assistance.     Registered nurses in the [facility] provide clinical advisement, health education, appointment booking, and other advisory services.  Call for this free service at [phone].        Advance Directives     An advance directive is a document which, in the event you are no longer able to make decisions for yourself, tells your healthcare team what kind of treatment you do or do not want to receive, or who you would like to make those " decisions for you.  If you do not currently have an advance directive, [facility] encourages you to create one.  For more information call:  [phone]), [phone] ([phone]),  or log on to [email or web address].        Language Assistance Services     ATTENTION: Language assistance services are available, free of charge. Please call [phone].      ATENCIÓN: Si raulla jim, tiene a richmond disposición servicios gratuitos de asistencia lingüística. Llame al [phone].     [facility] Ý: N?u b?n nói Ti?ng Vi?t, có các d?ch v? h? tr? ngôn ng? mi?n phí dành cho b?n. G?i s? [phone].        Heart Failure Education       Heart Failure: Being Active  You have a condition called heart failure. Being active doesnt mean that you have to wear yourself out. Even a little movement each day helps to strengthen your heart. If you cant get out to exercise, you can do simple stretching and strengthening exercises at home. These are good ways to keep you well-conditioned and prevent you and your heart from becoming excessively weak.    Ideas to get you started  · Add a little movement to things you do now. Walk to mail letters. Park your car at the far end of the parking lot and walk to the store. Walk up a flight of stairs instead of taking the elevator.  · Choose activities you enjoy. You might walk, swim, or ride an exercise bike. Things like gardening and washing the car count, too. Other possibilities include: washing dishes, walking the dog, walking around the mall, and doing aerobic activities with friends.  · Join a group exercise program at a [facility] or [facility], a senior center, or a community center. Or look into a hospital cardiac rehabilitation program. Ask your doctor if you qualify.  Tips to keep you going  · Get up and get dressed each day. Go to a coffee shop and read a newspaper or go somewhere that you'll be in the presence of other active people. Youll feel more like being active.  · Make  a plan. Choose one or more activities that you enjoy and that you can easily do. Then plan to do at least one each day. You might write your plan on a calendar.  · Go with a friend or a group if you like company. This can help you feel supported and stay motivated, too.  · Plan social events that you enjoy. This will keep you mentally engaged as well as physically motivated to do things you find pleasure in.  For your safety  · Talk with your healthcare provider before starting an exercise program.  · Exercise indoors when its too hot or too cold outside, or when the air quality is poor. Try walking at a shopping mall.  · Wear socks and sturdy shoes to maintain your balance and prevent falls.  · Start slowly. Do a few minutes several times a day at first. Increase your time and speed little by little.  · Stop and rest whenever you feel tired or get short of breath.  · Dont push yourself on days when you dont feel well.  Date Last Reviewed: 3/20/2016  © 6272-6201 [employer]. [address] [place] [address]. All rights reserved. This information is not intended as a substitute for professional medical care. Always follow your healthcare professional's instructions.              Heart Failure: Evaluating Your Heart  You have a condition called heart failure. To evaluate your condition, your doctor will examine you, ask questions, and do some tests. Along with looking for signs of heart failure, the doctor looks for any other health problems that may have led to heart failure. The results of your evaluation will help your doctor form a treatment plan.  Health history and physical exam  Your visit will start with a health history. Tell the doctor about any symptoms youve noticed and about all medicines you take. Then youll have a physical exam. This includes listening to your heartbeat and breathing. Youll also be checked for swelling (edema) in your legs and neck. When you have fluid  buildup or fluid in the lungs, it may be called congestive heart failure.  Diagnosing heart failure     During an echocardiogram, sound waves bounce off the heart. These are converted into a picture on the screen.   The following may be done to help your doctor form a diagnosis:  · X-rays show the size and shape of your heart. These pictures can also show fluid in your lungs.  · An electrocardiogram (ECG or EKG) shows the pattern of your heartbeat. Small pads (electrodes) are placed on your chest, arms, and legs. Wires connect the pads to the ECG machine, which records your hearts electrical signals. This can give the doctor information about heart function.  · An echocardiogram uses ultrasound waves to show the structure and movement of your heart muscle. This shows how well the heart pumps. It also shows the thickness of the heart walls, and if the heart is enlarged. It is one of the most useful, non-invasive tests as it provides information about the heart's general function. This helps your doctor make treatment decisions.  · Lab tests evaluate small amounts of blood or urine for signs of problems. A BNP lab test can help diagnose and evaluate heart failure. BNP stands for B-type natriuretic peptide. The ventricles secrete more BNP when heart failure worsens. Lab tests can also provide information about metabolic dysfunction or heart dysfunction.  Your treatment plan  Based on the results of your evaluation and tests, your doctor will develop a treatment plan. This plan is designed to relieve some of your heart failure symptoms and help make you more comfortable. Your treatment plan may include:  · Medicine to help your heart work better and improve your quality of life  · Changes in what you eat and drink to help prevent fluid from backing up in your body  · Daily monitoring of your weight and heart failure symptoms to see how well your treatment plan is working  · Exercise to help you stay healthy  · Help  with quitting smoking  · Emotional and psychological support to help adjust to the changes  · Referrals to other specialists to make sure you are being treated comprehensively  Date Last Reviewed: 3/21/2016  © 3566-5982 [employer]. [address] [place] [address], [place] [address]. All rights reserved. This information is not intended as a substitute for professional medical care. Always follow your healthcare professional's instructions.              Heart Failure: Making Changes to Your Diet  You have a condition called heart failure. When you have heart failure, excess fluid is more likely to build up in your body because your heart isn't working well. This makes the heart work harder to pump blood. Fluid buildup causes symptoms such as shortness of breath and swelling (edema). This is often referred to as congestive heart failure or CHF. Controlling the amount of salt (sodium) you eat may help stop fluid from building up. Your doctor may also tell you to reduce the amount of fluid you drink.  Reading food labels    Your healthcare provider will tell you how much sodium you can eat each day. Read food labels to keep track. Keep in mind that certain foods are high in salt. These include canned, frozen, and processed foods. Check the amount of sodium in each serving. Watch out for high-sodium ingredients. These include MSG (monosodium glutamate), baking soda, and sodium phosphate.   Eating less salt  Give yourself time to get used to eating less salt. It may take a little while. Here are some tips to help:  · Take the saltshaker off the table. Replace it with salt-free herb mixes and spices.  · Eat fresh or plain frozen vegetables. These have much less salt than canned vegetables.  · Choose low-sodium snacks like sodium-free pretzels, crackers, or air-popped popcorn.  · Dont add salt to your food when youre cooking. Instead, season your foods with pepper, lemon, garlic, or onion.  · When you eat out, ask  that your food be cooked without added salt.  · Avoid eating fried foods as these often have a great deal of salt.  If youre told to limit fluids  You may need to limit how much fluid you have to help prevent swelling. This includes anything that is liquid at room temperature, such as ice cream and soup. If your doctor tells you to limit fluid, try these tips:  · Measure drinks in a measuring cup before you drink them. This will help you meet daily goals.  · Chill drinks to make them more refreshing.  · Suck on frozen lemon wedges to quench thirst.  · Only drink when youre thirsty.  · Chew sugarless gum or suck on hard candy to keep your mouth moist.  · Weigh yourself daily to know if your body's fluid content is rising.  My sodium goal  Your healthcare provider may give you a sodium goal to meet each day. This includes sodium found in food as well as salt that you add. My goal is to eat no more than ___________ mg of sodium per day.     When to call your doctor  Call your doctor right away if you have any symptoms of worsening heart failure. These can include:  · Sudden weight gain  · Increased swelling of your legs or ankles  · Trouble breathing when youre resting or at night  · Increase in the number of pillows you have to sleep on  · Chest pain, pressure, discomfort, or pain in the jaw, neck, or back   Date Last Reviewed: 3/21/2016  © 5212-6239 [employer]. [address] [place] [address], [place] [address]. All rights reserved. This information is not intended as a substitute for professional medical care. Always follow your healthcare professional's instructions.              Heart Failure: Medicines to Help Your Heart    You have a condition called heart failure (also known as congestive heart failure, or CHF). Your doctor will likely prescribe medicines for heart failure and any underlying health problems you have. Most heart failure patients take one or more types of medicinen. Your healthcare  provider will work to find the combination of medicines that works best for you.  Heart failure medicines  Here are the most common heart failure medicines:  · ACE inhibitors lower blood pressure and decrease strain on the heart. This makes it easier for the heart to pump. Angiotensin receptor blockers have similar effects. These are prescribed for some patients instead of ACE inhibitors.  · Beta-blockers relieve stress on the heart. They also improve symptoms. They may also improve the heart's pumping action over time.  · Diuretics (also called water pills) help rid your body of excess water. This can help rid your body of swelling (edema). Having less fluid to pump means your heart doesnt have to work as hard. Some diuretics make your body lose a mineral called potassium. Your doctor will tell you if you need to take supplements or eat more foods high in potassium.  · Digoxin helps your heart pump with more strength. This helps your heart pump more blood with each beat. So, more oxygen-rich blood travels to the rest of the body.  · Aldosterone antagonists help alter hormones and decrease strain on the heart.  · Hydralazine and nitrates are two separate medicines used together to treat heart failure. They may come in one combination pill. They lower blood pressure and decrease how hard the heart has to pump.  Medicines for related conditions  Controlling other heart problems helps keep heart failure under control, too. Depending on other heart problems you have, medicines may be prescribed to:  · Lower blood pressure (antihypertensives).  · Lower cholesterol levels (statins).  · Prevent blood clots (anticoagulants or aspirin).  · Keep the heartbeat steady (antiarrhythmics).  Date Last Reviewed: 3/5/2016  © 9714-5375 [employer]. [address] [place] [address], [place] [address]. All rights reserved. This information is not intended as a substitute for professional medical care. Always follow your  healthcare professional's instructions.              Heart Failure: Procedures That May Help    The heart is a muscle that pumps oxygen-rich blood to all parts of the body. When you have heart failure, the heart is not able to pump as well as it should. Blood and fluid may back up into the lungs (congestive heart failure), and some parts of the body dont get enough oxygen-rich blood to work normally. These problems lead to the symptoms of heart failure.     Certain procedures may help the heart pump better in some cases of heart failure. Some procedures are done to treat health problems that may have caused the heart failure such as coronary artery disease or heart rhythm problems. For more serious heart failure, other options are available.  Treating artery and valve problems  If you have coronary artery disease or valve disease, procedures may be done to improve blood flow. This helps the heart pump better, which can improve heart failure symptoms. First, your doctor may do a cardiac catheterization to help detect clogged blood vessels or valve damage. During this procedure, a  thin tube (catheter) in inserted into a blood vessel and guided to the heart. There a dye is injected and a special type of X-ray (angiogram) is taken of the blood vessels. Procedures to open a blocked artery or fix damaged valves can also be done using catheterization.  · Angioplasty uses a balloon-tipped instrument at the end of the catheter. The balloon is inflated to widen the narrowed artery. In many cases, a stent is expanded to further support the narrowed artery. A stent is a metal mesh tube.  · Valve surgery repairs or replacement of faulty valves can also be done during catheterization so blood can flow properly through the chambers of the heart.  Bypass surgery is another option to help treat blocked arteries. It uses a healthy blood vessel from elsewhere in the body. The healthy blood vessel is attached above and below the  blocked area so that blood can flow around the blocked artery.  Treating heart rhythm problems  A device may be placed in the chest to help a weak heart maintain a healthy, heartbeat so the heart can pump more effectively:  · Pacemaker. A pacemaker is an implanted device that regulates your heartbeat electronically. It monitors your heart's rhythm and generates a painless electric impulse that helps the heart beat in a regular rhythm. A pacemaker is programmed to meet your specific heart rhythm needs.  · Biventricular pacing/cardiac resynchronization therapy. A type of pacemaker that paces both pumping chambers of the heart at the same time to coordinate contractions and to improve the heart's function. Some people with heart failure are candidates for this therapy.  · Implantable cardioverter defibrillator. A device similar to a pacemaker that senses when the heart is beating too fast and delivers an electrical shock to convert the fast rhythm to a normal rhythm. This can be a life saving device.  In severe cases  In more serious cases of heart failure when other treatments no longer work, other options may include:  · Ventricular assist devices (VADs). These are mechanical devices used to take over the pumping function for one or both of the heart's ventricles, or pumping chambers. A VAD may be necessary when heart failure progresses to the point that medicines and other treatments no longer help. In some cases, a VAD may be used as a bridge to transplant.  · Heart transplant. This is replacing the diseased heart with a healthy one from a donor. This is an option for a few people who are very sick. A heart transplant is very serious and not an option for all patients. Your doctor can tell you more.  Date Last Reviewed: 3/20/2016  © 4809-0178 [employer]. [address] [place] [address], [place] [address]. All rights reserved. This information is not intended as a substitute for professional medical care.  Always follow your healthcare professional's instructions.              Heart Failure: Tracking Your Weight  You have a condition called heart failure. When you have heart failure, a sudden weight gain or a steady rise in weight is a warning sign that your body is retaining too much water and salt. This could mean your heart failure is getting worse. If left untreated, it can cause problems for your lungs and result in shortness of breath. Weighing yourself each day is the best way to know if youre retaining water. If your weight goes up quickly, call your doctor. You will be given instructions on how to get rid of the excess water. You will likely need medicines and to avoid salt. This will help your heart work better.  Call your doctor if you gain more than 2 pounds in 1 day, more than 5 pounds in 1 week, or whatever weight gain you were told to report by your doctor. This is often a sign of worsening heart failure and needs to be evaluated and treated. Your doctor will tell you what to do next.   Tips for weighing yourself    · Weigh yourself at the same time each morning, wearing the same clothes. Weigh yourself after urinating and before eating.  · Use the same scale each day. Make sure the numbers are easy to read. Put the scale on a flat, hard surface -- not on a rug or carpet.  · Do not stop weighing yourself. If you forget one day, weigh again the next morning.  How to use your weight chart  · Keep your weight chart near the scale. Write your weight on the chart as soon as you get off the scale.  · Fill in the month and the start date on the chart. Then write down your weight each day. Your chart will look like this:    · If you miss a day, leave the space blank. Weigh yourself the next day and write your weight in the next space.  · Take your weight chart with you when you go to see your doctor.  Date Last Reviewed: 3/20/2016  © 2844-5778 [employer]. [address] [place] [address], [place]  [address]. All rights reserved. This information is not intended as a substitute for professional medical care. Always follow your healthcare professional's instructions.              Heart Failure: Warning Signs of a Flare-Up  You have a condition called heart failure. Once you have heart failure, flare-ups can happen. Below are signs that can mean your heart failure is getting worse. If you notice any of these warning signs, call your healthcare provider.  Swelling    · Your feet, ankles, or lower legs get puffier.  · You notice skin changes on your lower legs.  · Your shoes feel too tight.  · Your clothes are tighter in the waist.  · You have trouble getting rings on or off your fingers.  Shortness of breath  · You have to breathe harder even when youre doing your normal activities or when youre resting.  · You are short of breath walking up stairs or even short distances.  · You wake up at night short of breath or coughing.  · You need to use more pillows or sit up to sleep.  · You wake up tired or restless.  Other warning signs  · You feel weaker, dizzy, or more tired.  · You have chest pain or changes in your heartbeat.  · You have a cough that wont go away.  · You cant remember things or dont feel like eating.  Tracking your weight  Gaining weight is often the first warning sign that heart failure is getting worse. Gaining even a few pounds can be a sign that your body is retaining excess water and salt. Weighing yourself each day in the morning after you urinate and before you eat, is the best way to know if you're retaining water. Get a scale that is easy to read and make sure you wear the same clothes and use the same scale every time you weigh. Your healthcare provider will show you how to track your weight. Call your doctor if you gain more than 2 pounds in 1 day, 5 pounds in 1 week, or whatever weight gain you were told to report by your doctor. This is often a sign of worsening heart failure and needs  to be evaluated and treated before it compromises your breathing. Your doctor will tell you what to do next.    Date Last Reviewed: 3/15/2016  © 4110-8291 [employer]. [address] [place] [address], [place] [address]. All rights reserved. This information is not intended as a substitute for professional medical care. Always follow your healthcare professional's instructions.              [facility] Sign-Up     Activating your [facility] account is as easy as 1-2-3!     1) Visit [email or web address], select Sign Up Now, enter this activation code and your date of birth, then select Next.  [email or web address]  Expires: 3/26/2017  7:42 AM      2) Create a username and password to use when you visit [facility] in the future and select a security question in case you lose your password and select Next.    3) Enter your e-mail address and click Sign Up!    Additional Information  If you have questions, please e-mail [email or web address] or call [phone] to talk to our [facility] staff. Remember, [facility] is NOT to be used for urgent needs. For medical emergencies, dial 911.          [facility] complies with applicable Federal civil rights laws and does not discriminate on the basis of race, color, national origin, age, disability, or sex.

## 2017-02-19 NOTE — H&P
[facility]  History & Physical    Subjective:      Chief Complaint/Reason for Admission: CHF Exacerbation    Randy Mcwilliams is a 56 y.o. male with history HFrEF (20% 1/2016), hypertension who presents with chief complaint of shortness of breath. The patient states that it started yesterday and that the shortness of breath is worse when laying flat. States he sleeps on 1 pillow at night. Also notes that increased shortness of breath on exertion. Pt states he takes 1 lasix a day as well as his hypertension medication and a daily potassium. Reports being out of his medications for the past few days.    Pt was had elevated BNP in 900s, CXR and CTA consistent with pulmonary edema. Pt was given 60mg IV  Lasix in ED and diurese 2500 cc. Pt endorses marked improvement in symptoms.    Past Medical History   Diagnosis Date    Acute systolic congestive heart failure 1/7/2016     EF 20%     Past Surgical History   Procedure Laterality Date    Leg surgery       History reviewed. No pertinent family history.  Social History   Substance Use Topics    Smoking status: Never Smoker    Smokeless tobacco: None    Alcohol use No         (Not in a hospital admission)  Review of patient's allergies indicates:  No Known Allergies     Review of Systems   Constitutional: Negative for chills and fever.   HENT: Negative for sore throat.    Eyes: Negative for blurred vision.   Respiratory: Positive for shortness of breath.    Cardiovascular: Positive for orthopnea and PND. Negative for chest pain and leg swelling.   Gastrointestinal: Negative for abdominal pain, nausea and vomiting.   Genitourinary: Negative for dysuria.   Musculoskeletal: Negative for back pain.   Skin: Negative for rash.   Neurological: Negative for focal weakness.   Psychiatric/Behavioral: Negative for depression.       Objective:      Vital Signs (Most Recent)  Temp: 98.6 °F (37 °C) (02/19/17 0655)  Pulse: (!) 111 (02/19/17 0951)  Resp: (!) 29  (02/19/17 0951)  BP: (!) 160/97 (02/19/17 0951)  SpO2: 96 % (02/19/17 0951)    Vital Signs Range (Last 24H):  Temp:  [98.6 °F (37 °C)]   Pulse:  []   Resp:  [23-29]   BP: (150-174)/()   SpO2:  [92 %-98 %]     Physical Exam   Constitutional: He is oriented to person, place, and time. He appears well-developed and well-nourished.   HENT:   Head: Normocephalic and atraumatic.   Eyes: EOM are normal. Pupils are equal, round, and reactive to light.   Neck: Normal range of motion. Neck supple.   Cardiovascular: Normal rate, regular rhythm, normal heart sounds and intact distal pulses.    Pulmonary/Chest: Effort normal. He has rales (bilateral rales in lower lobes).   Abdominal: Soft. He exhibits no distension.   Musculoskeletal: Normal range of motion.   Neurological: He is alert and oriented to person, place, and time.   Skin: Skin is warm and dry.   Psychiatric: He has a normal mood and affect. His behavior is normal. Judgment and thought content normal.   Nursing note and vitals reviewed.      Assessment:      Pt is a 56 year old male with a history of HFrEF and hypertension who presents with acute on chronic CHF exacerbation    Plan:      1. Acute on Chronic exacerbation of HFrEF  - pt with chronic systolic HF with EF 20% on 1/2016  - on daily lasix 40mg at home  - states he has been out of his meds for the past few days, denies any chest pain/diaphoresis/n/v  - CT/CXR in ER consistent with pulmonary edema  - 60mg IV lasix x1 in ED diurese 2500 cc  - will continue on 60mg IV lasix BID and strict i/o  - ECHO tomorrow to monitor worsening/improvement of EF relative to 1/2016  - if persistent EF <30% will consult Cards and start on aldactone  - no previous workup for etiology of CHF, presumed 2/2 uncontrolled hypertension with LVH from echo on 1/2016    2. Hypertension  - pt on lisinopril 5 mg at home as well as carvedilol  - will hold beta blocker in setting of acute exacerbation CHF but restart on  discharge    3. AMELIA  - pt on valium as outpatient, will continue as to not precipitate withdrawal    4. Hyperbilirubinemia  - elevated t-bili of 1.6  - likely 2/2 to hepatic congestion, will get hep panel for completeness    ppx - lovenox for vte and pepcid for GI    dispo - continue monitor strict I/O, PT and ECHO tomorrow and discharge when clinically improved    Jose Fierro MD  PGY-2   12:56 PM

## 2017-02-19 NOTE — ED NOTES
APPEARANCE: Alert, oriented and in no acute distress.  CARDIAC: tachycardic, 114, no murmur heard. Denies CP  PERIPHERAL VASCULAR: peripheral pulses present. Normal cap refill. No edema. Warm to touch.    RESPIRATORY: sudden onset of shortness of breath around midnight last night, denies pain. Worse with lying flat. Coarse lung sounds to left lower lobe,otherwise normal lung sounds. Moderate distress noted.   GASTRO: soft, bowel sounds normal, no tenderness, no abdominal distention.  MUSC: Full ROM. No bony tenderness or soft tissue tenderness. No obvious deformity.  SKIN: Skin is warm and dry, normal skin turgor, mucous membranes moist.  NEURO: 5/5 strength major flexors/extensors bilaterally. Sensory intact to light touch bilaterally. Colman coma scale: eyes open spontaneously-4, oriented & converses-5, obeys commands-6. No neurological abnormalities.   MENTAL STATUS: awake, alert and aware of environment.  EYE: PERRL, both eyes: pupils brisk and reactive to light. Normal size.  ENT: EARS: no obvious drainage. NOSE: no active bleeding.   BREAST: symmetrical. No masses. No tenderness.  GENITALIA: Normal external genitalia.

## 2017-02-19 NOTE — PLAN OF CARE
Problem: Patient Care Overview  Goal: Plan of Care Review  Outcome: Ongoing (interventions implemented as appropriate)  Pt received on nasal cannula at 2 lpm.  SPO2  94%.  Pt in no apparent respiratory distress. Will continue to monitor.

## 2017-02-19 NOTE — NURSING
Patient arrived to  with escort service and wife. AAO x4. O2 at 2l/nc in use. No SMITH noted. Tele monitor applied as per orders. IV site dressing c/d/i. Patient states feeling much better at this time. For further data refer to admission assessment data sheets. Will cont to monitor pt and intervene prn

## 2017-02-19 NOTE — ED PROVIDER NOTES
Encounter Date: 2/19/2017       History     Chief Complaint   Patient presents with    Shortness of Breath     shortness of breath with cough since midnight     Review of patient's allergies indicates:  No Known Allergies  HPI Comments: CHIEF COMPLAINT: Shortness of breath    HISTORY OF PRESENT ILLNESS: This is a 56-year-old  male with a history of CHF with very low ejection fraction who presents to the emergency Department today with shortness of breath.  He reports that his shortness of breath came on last night.  He is having increasing difficulty breathing when he lies down at night.  Seems to get better when he sits up.  He has been having cough with no sputum production.  No fever, chills or sweats.  No chest pain.  No palpitations.     REVIEW OF SYSTEMS:  Constitutional: No fever, no chills.  Eyes: No discharge. No pain.  HENT: No nasal drainage. No ear ache. No sore throat.  Cardiovascular: See above.  Respiratory: See above.  Gastrointestinal: No abdominal pain, no vomiting. No diarrhea.  Genitourinary: No hematuria, dysuria, urgency.  Musculoskeletal: No back pain.  Skin: No rashes, no lesions.  Neurological: No headache, no focal weakness.    ALLERGIES reviewed  Family history reviewed  Home medications reviewed  Problem list reviewed        The history is provided by the patient.     Past Medical History   Diagnosis Date    Acute systolic congestive heart failure 1/7/2016     EF 20%     Past Medical History Pertinent Negatives   Diagnosis Date Noted    Diabetes mellitus 1/7/2016    Encounter for blood transfusion 1/7/2016     Past Surgical History   Procedure Laterality Date    Leg surgery       History reviewed. No pertinent family history.  Social History   Substance Use Topics    Smoking status: Never Smoker    Smokeless tobacco: None    Alcohol use No     Review of Systems   All other systems reviewed and are negative.      Physical Exam   Initial Vitals   BP Pulse Resp Temp SpO2    02/19/17 0655 02/19/17 0655 02/19/17 0655 02/19/17 0655 02/19/17 0655   174/114 114 24 98.6 °F (37 °C) 92 %     Physical Exam    Nursing note and vitals reviewed.  Constitutional: He appears well-developed and well-nourished. He is not diaphoretic. No distress.   HENT:   Head: Normocephalic and atraumatic.   Nose: Nose normal.   Mouth/Throat: Oropharynx is clear and moist.   Eyes: Conjunctivae and EOM are normal. Pupils are equal, round, and reactive to light. No scleral icterus.   Neck: Normal range of motion. Neck supple. No JVD present.   Cardiovascular: Regular rhythm and normal heart sounds. Exam reveals no gallop and no friction rub.    No murmur heard.  Tachycardic rate   Pulmonary/Chest: Breath sounds normal. No stridor. No respiratory distress. He exhibits no tenderness.   Breath sounds coarse throughout all lung zones, no wheezing.   Abdominal: Soft. Bowel sounds are normal. He exhibits no distension and no mass. There is no tenderness. There is no rebound and no guarding.   Musculoskeletal: Normal range of motion. He exhibits no edema or tenderness.   Lymphadenopathy:     He has no cervical adenopathy.   Neurological: He is alert and oriented to person, place, and time. He has normal strength. No cranial nerve deficit.   Skin: Skin is warm and dry. No rash noted. No pallor.   Psychiatric: He has a normal mood and affect. Thought content normal.         ED Course   Procedures  Labs Reviewed   CBC W/ AUTO DIFFERENTIAL - Abnormal; Notable for the following:        Result Value    WBC 13.83 (*)     MCV 80 (*)     Gran # 11.5 (*)     Gran% 82.8 (*)     Lymph% 11.6 (*)     All other components within normal limits   COMPREHENSIVE METABOLIC PANEL - Abnormal; Notable for the following:     Potassium 3.4 (*)     CO2 18 (*)     Glucose 126 (*)     Total Bilirubin 1.6 (*)     All other components within normal limits   B-TYPE NATRIURETIC PEPTIDE - Abnormal; Notable for the following:      (*)     All other  components within normal limits   TROPONIN I - Abnormal; Notable for the following:     Troponin I 0.046 (*)     All other components within normal limits   D DIMER, QUANTITATIVE - Abnormal; Notable for the following:     D-Dimer 1.29 (*)     All other components within normal limits   LIPID PANEL - Abnormal; Notable for the following:     Cholesterol 117 (*)     LDL Cholesterol 53.4 (*)     All other components within normal limits   PROTEIN / CREATININE RATIO, URINE - Abnormal; Notable for the following:     Creatinine, Random Ur 18.2 (*)     All other components within normal limits   TROPONIN I - Abnormal; Notable for the following:     Troponin I 0.041 (*)     All other components within normal limits   CULTURE, BLOOD   CULTURE, BLOOD   PROTIME-INR   LACTIC ACID, PLASMA   LIPID PANEL   URINALYSIS   HEPATITIS PANEL, ACUTE   HEPATITIS PANEL, ACUTE     EKG Readings: (Independently Interpreted)   Initial Reading: No STEMI. Previous EKG: Compared with most recent EKG   102, sinus tachycardia with premature supraventricular complex, normal axis, LVH, or some repolarization abnormalities, no ST elevated MI.  Similar to previous       X-Rays: Other Radiology Reports:   CTA Chest Non-Coronary - PE Study (Final result) Result time: 02/19/17 11:32:02    Final result by Renee Marcus MD (02/19/17 11:32:02)    Impression:        1.  No pulmonary artery thromboembolism.  2.  Bilateral groundglass opacification with moderate bilateral pleural effusions and cardiomegaly.  Pulmonary edema favored over infectious process.      Electronically signed by: RENEE MARCUS MD  Date: 02/19/17  Time: 11:32       Narrative:      Pulmonary CT angiogram    Technique: Helical CT scan of the chest was performed after administration of 100 mL Omnipaque 350.  Images optimized for opacification of pulmonary arteries.  Coronal and sagittal reformations are provided for interpretation.    Comparison: None.    Findings:    There are no filling  defects in the pulmonary arteries to the level of distal segmental branches.  No CT evidence for right heart strain.    No pericardial effusion.  There are moderate bilateral pleural effusions. No mediastinal or hilar lymphadenopathy.  Heart is enlarged.  Thoracic aorta is normal caliber.      There is groundglass opacification of the bilateral lower and left upper lobes.  No pneumothorax.  Central airways are patent, without endobronchial lesions.      Regional osseous structures demonstrate no aggressive lytic or blastic lesions.  Limited evaluation of upper abdomen is unremarkable.              X-Ray Chest PA And Lateral (Final result) Result time: 02/19/17 07:56:46    Final result by Martha Kuo MD (02/19/17 07:56:46)    Impression:        Cardiomegaly with pulmonary vascular congestion and mixed interstitial and alveolar opacity suggestive of edema. There is more focal patchy consolidation present within the left lower lobe and superimposed infection/pneumonia is not excluded.      Electronically signed by: MARTHA KUO  Date: 02/19/17  Time: 07:56       Narrative:      Comparison: 6/2/2016    Technique: PA and lateral radiographs of the chest.    Findings: Cardiac monitoring leads overlie the chest. The cardiac silhouette appears mildly enlarged. There is prominence of the central pulmonary vasculature. There are increased interstitial markings bilaterally with mixed interstitial and alveolar opacities suggestive of pulmonary edema. There are more focal regions of consolidation projecting over the left mid and lower lung zones and superimposed pneumonia/infection is not excluded.  Small volume of right-sided pleural fluid is not excluded. There is no evidence of pneumothorax. Visualized osseous structures appear intact.                  Medical Decision Making:   History:   Old Records Summarized: records from clinic visits and records from previous admission(s).  Differential Diagnosis:   Bronchitis,  pneumonia, reactive airway, asthma, COPD, congestive heart failure, PE, MI.  Independently Interpreted Test(s):   I have ordered and independently interpreted EKG Reading(s) - see prior notes  Clinical Tests:   Lab Tests: Ordered and Reviewed  Radiological Study: Ordered and Reviewed  Medical Tests: Ordered and Reviewed  ED Management:  This is a 56-year-old gentleman with a history of CHF or presents to the emergency Department today with shortness of breath.  Initial suspicion of pulmonary edema was very high.  His chest x-ray was read as pulmonary edema with a possible infectious etiology, given his recent cough I did give him a dose of Avelox, ordered a CTA to further evaluate his shortness of breath given the fact that he had an elevated d-dimer, is no pulmonary embolism found, this CT shows more likely pulmonary edema than infectious etiology.  The patient has received Lasix he is diuresing.  He will be admitted to the family medicine group for further diuresis and continued care.  I discussed the need for admission with the patient he understands and he agrees.  Other:   I have discussed this case with another health care provider.       <> Summary of the Discussion: I spoke with the on-call physician for [facility] family medicine, they will accept the patient.                   ED Course     Clinical Impression:   The primary encounter diagnosis was Acute systolic congestive heart failure. Diagnoses of SOB (shortness of breath) and Congestive heart failure, unspecified congestive heart failure chronicity, unspecified congestive heart failure type were also pertinent to this visit.    Disposition:   Disposition: Admitted  Condition: Stable       Laurence Lowry MD  02/19/17 [id number]

## 2017-02-20 VITALS
TEMPERATURE: 98 F | DIASTOLIC BLOOD PRESSURE: 76 MMHG | WEIGHT: 200 LBS | HEIGHT: 74 IN | OXYGEN SATURATION: 95 % | HEART RATE: 97 BPM | SYSTOLIC BLOOD PRESSURE: 122 MMHG | RESPIRATION RATE: 20 BRPM | BODY MASS INDEX: 25.67 KG/M2

## 2017-02-20 LAB
ALBUMIN SERPL BCP-MCNC: 3.2 G/DL
ALP SERPL-CCNC: 91 U/L
ALT SERPL W/O P-5'-P-CCNC: 14 U/L
ANION GAP SERPL CALC-SCNC: 12 MMOL/L
AST SERPL-CCNC: 12 U/L
BASOPHILS # BLD AUTO: 0.01 K/UL
BASOPHILS NFR BLD: 0.1 %
BILIRUB SERPL-MCNC: 0.9 MG/DL
BUN SERPL-MCNC: 12 MG/DL
CALCIUM SERPL-MCNC: 8.9 MG/DL
CHLORIDE SERPL-SCNC: 105 MMOL/L
CO2 SERPL-SCNC: 21 MMOL/L
CREAT SERPL-MCNC: 0.9 MG/DL
DIASTOLIC DYSFUNCTION: YES
DIFFERENTIAL METHOD: ABNORMAL
EOSINOPHIL # BLD AUTO: 0.1 K/UL
EOSINOPHIL NFR BLD: 0.4 %
ERYTHROCYTE [DISTWIDTH] IN BLOOD BY AUTOMATED COUNT: 14.1 %
EST. GFR  (AFRICAN AMERICAN): >60 ML/MIN/1.73 M^2
EST. GFR  (NON AFRICAN AMERICAN): >60 ML/MIN/1.73 M^2
GLUCOSE SERPL-MCNC: 100 MG/DL
HAV IGM SERPL QL IA: NEGATIVE
HBV CORE IGM SERPL QL IA: NEGATIVE
HBV SURFACE AG SERPL QL IA: NEGATIVE
HCT VFR BLD AUTO: 45 %
HCV AB SERPL QL IA: NEGATIVE
HGB BLD-MCNC: 15.6 G/DL
LYMPHOCYTES # BLD AUTO: 2.5 K/UL
LYMPHOCYTES NFR BLD: 19.6 %
MAGNESIUM SERPL-MCNC: 2 MG/DL
MCH RBC QN AUTO: 27.8 PG
MCHC RBC AUTO-ENTMCNC: 34.7 %
MCV RBC AUTO: 80 FL
MITRAL VALVE REGURGITATION: ABNORMAL
MONOCYTES # BLD AUTO: 0.8 K/UL
MONOCYTES NFR BLD: 6.3 %
NEUTROPHILS # BLD AUTO: 9.5 K/UL
NEUTROPHILS NFR BLD: 73.3 %
PHOSPHATE SERPL-MCNC: 4.3 MG/DL
PLATELET # BLD AUTO: 234 K/UL
PMV BLD AUTO: 11.7 FL
POTASSIUM SERPL-SCNC: 3.3 MMOL/L
PROT SERPL-MCNC: 6.5 G/DL
RBC # BLD AUTO: 5.61 M/UL
RETIRED EF AND QEF - SEE NOTES: 20 (ref 55–65)
SODIUM SERPL-SCNC: 138 MMOL/L
TRICUSPID VALVE REGURGITATION: ABNORMAL
TROPONIN I SERPL DL<=0.01 NG/ML-MCNC: 0.04 NG/ML
WBC # BLD AUTO: 12.93 K/UL

## 2017-02-20 PROCEDURE — 84484 ASSAY OF TROPONIN QUANT: CPT

## 2017-02-20 PROCEDURE — 93306 TTE W/DOPPLER COMPLETE: CPT

## 2017-02-20 PROCEDURE — 97535 SELF CARE MNGMENT TRAINING: CPT

## 2017-02-20 PROCEDURE — 36415 COLL VENOUS BLD VENIPUNCTURE: CPT

## 2017-02-20 PROCEDURE — 97165 OT EVAL LOW COMPLEX 30 MIN: CPT

## 2017-02-20 PROCEDURE — 93010 ELECTROCARDIOGRAM REPORT: CPT | Mod: ,,, | Performed by: INTERNAL MEDICINE

## 2017-02-20 PROCEDURE — 97161 PT EVAL LOW COMPLEX 20 MIN: CPT

## 2017-02-20 PROCEDURE — 85025 COMPLETE CBC W/AUTO DIFF WBC: CPT

## 2017-02-20 PROCEDURE — 84100 ASSAY OF PHOSPHORUS: CPT

## 2017-02-20 PROCEDURE — 25000003 PHARM REV CODE 250: Performed by: STUDENT IN AN ORGANIZED HEALTH CARE EDUCATION/TRAINING PROGRAM

## 2017-02-20 PROCEDURE — 83735 ASSAY OF MAGNESIUM: CPT

## 2017-02-20 PROCEDURE — 93005 ELECTROCARDIOGRAM TRACING: CPT

## 2017-02-20 PROCEDURE — 63600175 PHARM REV CODE 636 W HCPCS: Performed by: FAMILY MEDICINE

## 2017-02-20 PROCEDURE — 25000003 PHARM REV CODE 250: Performed by: FAMILY MEDICINE

## 2017-02-20 PROCEDURE — 94761 N-INVAS EAR/PLS OXIMETRY MLT: CPT

## 2017-02-20 PROCEDURE — 80053 COMPREHEN METABOLIC PANEL: CPT

## 2017-02-20 RX ORDER — AMIODARONE HYDROCHLORIDE 400 MG/1
400 TABLET ORAL 2 TIMES DAILY
Qty: 14 TABLET | Refills: 0 | Status: SHIPPED | OUTPATIENT
Start: 2017-02-20 | End: 2017-02-27

## 2017-02-20 RX ORDER — SPIRONOLACTONE 25 MG/1
25 TABLET ORAL DAILY
Qty: 90 TABLET | Refills: 1 | Status: ON HOLD | OUTPATIENT
Start: 2017-02-20 | End: 2017-07-03

## 2017-02-20 RX ORDER — POTASSIUM CHLORIDE 20 MEQ/1
60 TABLET, EXTENDED RELEASE ORAL 2 TIMES DAILY
Status: DISCONTINUED | OUTPATIENT
Start: 2017-02-20 | End: 2017-02-20 | Stop reason: HOSPADM

## 2017-02-20 RX ORDER — POTASSIUM CHLORIDE 20 MEQ/1
60 TABLET, EXTENDED RELEASE ORAL 2 TIMES DAILY
Status: DISCONTINUED | OUTPATIENT
Start: 2017-02-20 | End: 2017-02-20

## 2017-02-20 RX ORDER — POTASSIUM CHLORIDE 20 MEQ/1
60 TABLET, EXTENDED RELEASE ORAL ONCE
Status: COMPLETED | OUTPATIENT
Start: 2017-02-20 | End: 2017-02-20

## 2017-02-20 RX ORDER — AMIODARONE HYDROCHLORIDE 200 MG/1
200 TABLET ORAL 2 TIMES DAILY
Qty: 180 TABLET | Refills: 1 | Status: ON HOLD | OUTPATIENT
Start: 2017-02-27 | End: 2017-07-03 | Stop reason: HOSPADM

## 2017-02-20 RX ORDER — CARVEDILOL 3.12 MG/1
3.12 TABLET ORAL 2 TIMES DAILY
Qty: 180 TABLET | Refills: 1 | Status: ON HOLD | OUTPATIENT
Start: 2017-02-20 | End: 2017-07-03

## 2017-02-20 RX ORDER — LISINOPRIL 10 MG/1
10 TABLET ORAL DAILY
Qty: 90 TABLET | Refills: 1 | Status: ON HOLD | OUTPATIENT
Start: 2017-02-20 | End: 2017-07-03

## 2017-02-20 RX ORDER — FUROSEMIDE 40 MG/1
40 TABLET ORAL 2 TIMES DAILY
Qty: 60 TABLET | Refills: 1 | Status: ON HOLD | OUTPATIENT
Start: 2017-02-20 | End: 2017-07-03

## 2017-02-20 RX ORDER — FAMOTIDINE 20 MG/1
20 TABLET, FILM COATED ORAL 2 TIMES DAILY
Status: DISCONTINUED | OUTPATIENT
Start: 2017-02-20 | End: 2017-02-20 | Stop reason: HOSPADM

## 2017-02-20 RX ORDER — POTASSIUM CHLORIDE 7.45 MG/ML
10 INJECTION INTRAVENOUS
Status: DISCONTINUED | OUTPATIENT
Start: 2017-02-20 | End: 2017-02-20

## 2017-02-20 RX ADMIN — NIFEDIPINE 30 MG: 30 TABLET, FILM COATED, EXTENDED RELEASE ORAL at 08:02

## 2017-02-20 RX ADMIN — FAMOTIDINE 20 MG: 10 INJECTION INTRAVENOUS at 08:02

## 2017-02-20 RX ADMIN — DIAZEPAM 5 MG: 5 TABLET ORAL at 08:02

## 2017-02-20 RX ADMIN — POTASSIUM CHLORIDE 60 MEQ: 1500 TABLET, EXTENDED RELEASE ORAL at 05:02

## 2017-02-20 RX ADMIN — LISINOPRIL 5 MG: 5 TABLET ORAL at 08:02

## 2017-02-20 RX ADMIN — ENOXAPARIN SODIUM 40 MG: 100 INJECTION SUBCUTANEOUS at 11:02

## 2017-02-20 RX ADMIN — ASPIRIN 81 MG 81 MG: 81 TABLET ORAL at 08:02

## 2017-02-20 RX ADMIN — FUROSEMIDE 60 MG: 10 INJECTION, SOLUTION INTRAMUSCULAR; INTRAVENOUS at 08:02

## 2017-02-20 NOTE — PLAN OF CARE
Discharge instructions reviewed with pt. Pt verbalizes understanding. PIV discontinued as per md order. Tip intact. Pt tolerated well. Telemetry discontinued.

## 2017-02-20 NOTE — PLAN OF CARE
Problem: Occupational Therapy Goal  Goal: Occupational Therapy Goal  Outcome: Outcome(s) achieved Date Met:  02/20/17  Pt performing ADLs and functional mobility independently. Pt's 02 94% with ambulation on RA- O2 remaining 94% or above with axs on RA. Pt with no further OT needs at this time. D/c OT

## 2017-02-20 NOTE — CONSULTS
Cardiology    Consult Requested By:   Reason for Consult: heart failure    SUBJECTIVE:     History of Present Illness:  Patient is a 56 y.o. male presents with no real previous history but about one year ago, he states he was short of breath and was admitted here and told his heart muscle was weak. He states he is followed at [facility] but ran out of his medications and has not taken any of his meds for the last two weeks at least. He does not give history of salt indiscretion and no history of chest pains. Over the past few days, he has noted increasing shortness of breath along with PND and orthonea and came to the ER where his blood pressure was markedly elevated .       Review of patient's allergies indicates:  No Known Allergies    Past Medical History   Diagnosis Date    Acute systolic congestive heart failure 1/7/2016     EF 20%     Past Surgical History   Procedure Laterality Date    Leg surgery       History reviewed. No pertinent family history.  Social History   Substance Use Topics    Smoking status: Never Smoker    Smokeless tobacco: None    Alcohol use No        Home meds:  No current facility-administered medications on file prior to encounter.      Current Outpatient Prescriptions on File Prior to Encounter   Medication Sig Dispense Refill    aspirin 81 MG Chew Take 1 tablet (81 mg total) by mouth once daily.  0    carvedilol (COREG) 6.25 MG tablet Take 1 tablet (6.25 mg total) by mouth 2 (two) times daily. (Patient taking differently: Take 6.25 mg by mouth 2 (two) times daily. Out of all medications) 60 tablet 3    diazePAM (VALIUM) 5 MG tablet Take 1 tablet (5 mg total) by mouth every 8 (eight) hours as needed (muscle spasm). 20 tablet 0    fluticasone (FLONASE) 50 mcg/actuation nasal spray 1 spray by Each Nare route 2 (two) times daily as needed. 15 g 0    furosemide (LASIX) 40 MG tablet Take 1 tablet (40 mg total) by mouth 2 (two) times daily. 60 tablet 0    ibuprofen (ADVIL,MOTRIN)  800 MG tablet Take 1 tablet (800 mg total) by mouth 3 (three) times daily. 90 tablet 0    lisinopril (PRINIVIL,ZESTRIL) 5 MG tablet Take 1 tablet (5 mg total) by mouth once daily. 30 tablet 3    oxycodone-acetaminophen (PERCOCET) 5-325 mg per tablet Take 1 tablet by mouth every 6 (six) hours as needed for Pain. 20 tablet 0    potassium chloride 20 mEq TbSR Take 20 mEq by mouth 2 (two) times daily. 60 tablet 3       Current meds:  Scheduled Meds:   aspirin  81 mg Oral Daily    diazePAM  5 mg Oral Q12H    enoxaparin  40 mg Subcutaneous Daily    famotidine (PF)  20 mg Intravenous Q12H    furosemide  60 mg Intravenous BID    lisinopril  5 mg Oral Daily    nifedipine 30 MG ORAL TR24  30 mg Oral Daily    potassium chloride  60 mEq Oral BID     Continuous Infusions:   PRN Meds:.dextrose 50%, dextrose 50%, glucagon (human recombinant), glucose, glucose, hydrALAZINE, ondansetron      OBJECTIVE:     Vital Signs (Most Recent)  Temp: 97.7 °F (36.5 °C) (02/20/17 1200)  Pulse: 99 (02/20/17 1200)  Resp: 20 (02/20/17 1200)  BP: 111/71 (02/20/17 1200)  SpO2: 97 % (02/20/17 1226)    Vital Signs Range (Last 24H):  Temp:  [97.7 °F (36.5 °C)-98.6 °F (37 °C)]   Pulse:  []   Resp:  [20-22]   BP: (111-140)/(71-85)   SpO2:  [92 %-97 %]     Physical Exam:  Neck: normal carotids, normal JVP, no bruits  Lungs: clear now  Heart: PMI displaced laterally, no left sternal lift, normal S1,S2, with no murmurs  Abd: negative  Exts: trace edema noted bilaterally     Laboratory:  LABS  CBC    Recent Labs  Lab 02/19/17  0717 02/20/17  0328   WBC 13.83* 12.93*   RBC 5.94 5.61   HGB 16.5 15.6   HCT 47.3 45.0    234   MCV 80* 80*   MCH 27.8 27.8   MCHC 34.9 34.7     BMP    Recent Labs  Lab 02/19/17  0717 02/20/17 0327    138   K 3.4* 3.3*   CO2 18* 21*    105   BUN 10 12   CREATININE 0.8 0.9   * 100         Recent Labs  Lab 02/19/17  0717 02/20/17 0327   CALCIUM 9.1 8.9   MG  --  2.0   PHOS  --  4.3        LFT    Recent Labs  Lab 02/19/17  0717 02/20/17  0327   PROT 7.2 6.5   ALBUMIN 3.6 3.2*   BILITOT 1.6* 0.9   AST 16 12   ALKPHOS 97 91   ALT 16 14       COAGS    Recent Labs  Lab 02/19/17  0717   INR 1.1     CE    Recent Labs  Lab 02/19/17  1306 02/19/17  1843 02/20/17  0327   TROPONINI 0.041* 0.049* 0.045*     BNP    Recent Labs  Lab 02/19/17  0717   *     Lipid panel:  Lab Results   Component Value Date    CHOL 117 (L) 02/19/2017     Lab Results   Component Value Date    HDL 46 02/19/2017     Lab Results   Component Value Date    LDLCALC 53.4 (L) 02/19/2017     Lab Results   Component Value Date    TRIG 88 02/19/2017     Lab Results   Component Value Date    CHOLHDL 39.3 02/19/2017     Diagnostic Results:  EKG: sinus; LVH with ST/T changes RAD with improvement in ST/T changes   CXR:mild increase in vasculature   troponins flat and negative  BNP elevated    Chart review:  Echo: 1/8/16: poor EF     ASSESSMENT/PLAN:     1. Exacerbation of heart failure in patient out of his medications. He was seen at [facility] 8/16 and was being set up for in ICD. No cath done. He however did not followup    Plan: 1. Continue lasix and increase dose of lisinopril and discontinue nifedipine. Resume carvedilol at 3.125 mg BID (he was on 6.25 mg BID) at one time; also, begin aldactone 25 mg daily and followup needed in our [facility] clinic at [facility]. He needs angiography to exclude ischemia as cause and an ICD.    Peggy Fisher MD  Tele strips with one episode of ventricular tachcardia. Thus, begin amiodarone 400 mg BID for one week and then 200 mg BID. Needs followup immediately

## 2017-02-20 NOTE — PLAN OF CARE
Follow-up With  Details  Why  Contact Info   Jose Fierro  On 2/27/2017  Outpatient Services, Monday 2:40pm  [address]   [address]   [facility] [address]  [phone]     Peggy Childs  Schedule an appointment as soon as possible for a visit in 1 week  to see Cardiologist at [facility] in 1 week  [address] [facility] [address] [phone]           attempted to schedule [facility] appointment by phone, no answer.   faxed referral to [facility] at [phone] for [facility]  Cardiologist appointment.   will follow up in the am regarding [facility] appointment status.

## 2017-02-20 NOTE — PLAN OF CARE
Problem: Patient Care Overview  Goal: Plan of Care Review  Safety precautions maintained. Call bell in reach. Non skid socks on. Bed locked and in lowest position. Instructed pt to call for assistance. Pt educated on Heart failure, s/s, med compliance, weighing self daily or weekly, low salt diet. Pt used teach back method to show evidence of learning. Pt verbalizes understanding.

## 2017-02-20 NOTE — PT/OT/SLP EVAL
"Physical Therapy  Evaluation/Discharge    Randy Mcwilliams   MRN: [id number]   Admitting Diagnosis: Acute systolic congestive heart failure    PT Received On: 17  PT Start Time: 1034     PT Stop Time: 1052    PT Total Time (min): 18 min       Billable Minutes:  Evaluation 18 minutes    Diagnosis: Acute systolic congestive heart failure  The primary encounter diagnosis was Acute systolic congestive heart failure. Diagnoses of SOB (shortness of breath) and Congestive heart failure, unspecified congestive heart failure chronicity, unspecified congestive heart failure type were also pertinent to this visit.      Past Medical History   Diagnosis Date    Acute systolic congestive heart failure 2016     EF 20%      Past Surgical History   Procedure Laterality Date    Leg surgery         General Precautions: Standard, fall  Orthopedic Precautions: N/A   Braces: N/A       Do you have any cultural, spiritual, Religion conflicts, given your current situation?: none    Patient History:  Living Environment Comment: Pt lives with wife in [facility] with 1 TH step, tub/shower combo, (I)PLOF with all ADLs, amb without AD, driving; no DME; works in construction  Equipment Currently Used at Home: none      Previous Level of Function:  Ambulation Skills: independent  Transfer Skills: independent  ADL Skills: independent  Work/Leisure Activity: independent    Subjective:  Communicated with nurse prior to session.  "I am fine today."  Chief Complaint: none stated  Patient goals: to return home    Pain Ratin/10               Pain Rating Post-Intervention: 0/10    Objective:   Patient found with: oxygen     Cognitive Exam:  Oriented to: Person, Place, Time and Situation    Follows Commands/attention: Follows multistep  commands  Communication: clear/fluent  Safety awareness/insight to disability: intact    Physical Exam:  Postural examination/scapula alignment: No postural abnormalities identified    Skin integrity: Visible skin " intact  Edema: None noted     Sensation:   Intact    Lower Extremity Range of Motion:  Right Lower Extremity: WFL  Left Lower Extremity: WFL    Lower Extremity Strength:  Right Lower Extremity: 4+ to 5  Left Lower Extremity: 4+ to 5    Gross motor coordination: WFL    Functional Mobility:  Bed Mobility:  Rolling/Turning to Left: Independent  Rolling/Turning Right: Independent  Scooting/Bridging: Independent  Supine to Sit: Independent  Sit to Supine: Independent    Transfers:  Sit <> Stand Assistance: Independent  Sit <> Stand Assistive Device: No Assistive Device  Bed <> Chair Assistance: Independent  Bed <> Chair Assistive Device: No Assistive Device  Toilet Transfer Technique: Stand Pivot  Toilet Transfer Assistance: Independent    Gait:   Gait Distance: 200ft  Assistance 1: Independent  Gait Assistive Device: No device  Gait Pattern: reciprocal    Balance:   Static Sit: NORMAL: No deviations seen in posture held statically  Dynamic Sit: NORMAL: No deviations seen in posture held dynamically  Static Stand: NORMAL: No deviations seen in posture held statically  Dynamic stand: NORMAL: No deviations seen in posture held dynamically    Therapeutic Activities and Exercises:  Pt demonstrates how he performs pursed lip breathing; activities as above-see O2 sats below    AM-PAC 6 CLICK MOBILITY  How much help from another person does this patient currently need?   1 = Unable, Total/Dependent Assistance  2 = A lot, Maximum/Moderate Assistance  3 = A little, Minimum/Contact Guard/Supervision  4 = None, Modified [place]/Independent    Turning over in bed (including adjusting bedclothes, sheets and blankets)?: 4  Sitting down on and standing up from a chair with arms (e.g., wheelchair, bedside commode, etc.): 4  Moving from lying on back to sitting on the side of the bed?: 4  Moving to and from a bed to a chair (including a wheelchair)?: 4  Need to walk in hospital room?: 4  Climbing 3-5 steps with a railing?: 4  Total  Score: 24     AM-PAC Raw Score CMS G-Code Modifier Level of Impairment Assistance   6 % Total / Unable   7 - 9 CM 80 - 100% Maximal Assist   10 - 14 CL 60 - 80% Moderate Assist   15 - 19 CK 40 - 60% Moderate Assist   20 - 22 CJ 20 - 40% Minimal Assist   23 CI 1-20% SBA / CGA   24 CH 0% Independent/ Mod I     Patient left up in chair with call button in reach, nurse notified and  family present.    Assessment:   Randy Mcwilliams is a 56 y.o. male with a medical diagnosis of Acute systolic congestive heart failure   Pt presents as independent with all transfers and amb without AD; O2 sats at rest with O2 at 1L~96%; rest on RA~95%; amb on RA~94%; after amb~95% and with 1-2 min rest, O2 sats 98% on RA; no further needs for acute PT services; will d/c PT.  Rehab identified problem list/impairments: Rehab identified problem list/impairments: impaired endurance    Rehab potential is good.    Activity tolerance: Good    Discharge recommendations: Discharge Facility/Level Of Care Needs: home     Barriers to discharge: Barriers to Discharge: None    Equipment recommendations: Equipment Needed After Discharge: none     GOALS:   Physical Therapy Goals     Not on file      Multidisciplinary Problems (Resolved)        Problem: Physical Therapy Goal    Goal Priority Disciplines Outcome Goal Variances Interventions   Physical Therapy Goal   (Resolved)     PT/OT, PT Outcome(s) achieved               PLAN:      (d/c acute PT services)      Plan of Care reviewed with: patient          Samira Kenny, PT  02/20/2017

## 2017-02-20 NOTE — PLAN OF CARE
Pt alarmed vtach on monitor. Pt asymptomatic and in NAD. Team at bedside on rounds. No new orders taken. Will continue to monitor pt

## 2017-02-20 NOTE — PLAN OF CARE
Problem: Patient Care Overview  Goal: Plan of Care Review  Outcome: Ongoing (interventions implemented as appropriate)  Received pt on 2L; SAT 93%

## 2017-02-20 NOTE — DISCHARGE SUMMARY
Discharge Summary      Admit Date: 2/19/2017    Discharge Date and Time:     Attending Physician: Gary Galvez III, MD     Discharge Physician: Jose Fierro    Principal Diagnoses: Acute systolic congestive heart failure  The primary encounter diagnosis was Acute systolic congestive heart failure. Diagnoses of SOB (shortness of breath) and Congestive heart failure, unspecified congestive heart failure chronicity, unspecified congestive heart failure type were also pertinent to this visit.    Discharged Condition: stable    Hospital Course: Randy Mcwilliams is a 56 y.o. male with a medical history  of HTN and acute systolic congestive heart failure (EF 20% 1/7/2016). who presented with Acute systolic congestive heart failure. The following is the hospital course problems:  Patient presented to the ED on Sunday 2/20/17 with shortness of breath for one day. Shortness of breath worsens when laying flat. He sleeps on pillow at night. Endorses increase shortness of breath on exertion. He is prescribed lasix and lisinopril. Patient reports that he has been out of his medications for the past few days.  In the ED physical exam was positive for bilateral rales.  Labs showed , troponins trend mildly elevated 0.041> 0.049> 0.045, and D-dimer 1.29. . Chest x-ray and CTA consistent with pulmonary edema. Patient subsequently given 60mg IV lasix in ED and diuresed 2500cc. Overnight patient diuresed ~ 5.6L with marked clinical improvement. Echo showed ...  Cards ... PT/OT ....  Patient stable. Recommendations include for patient to start beta blockers and aldactone. Follow-up with his PCP.    Consults: None    ...  .labs    Significant Diagnostic Studies: labs: , radiology: X-Ray: CXR: X-Ray Chest 1 View (CXR): No results found for this visit on 02/19/17.  CT scan: CT ABDOMEN PELVIS WITHOUT CONTRAST: No results found for this visit on 02/19/17., cardiac graphics: ECG:  and Echocardiogram:  and angiography:     Treatments:  cardiac meds: furosemide, lisinopril, and nifedipine    Disposition: Home or Self Care    Patient Instructions:   Current Discharge Medication List      CONTINUE these medications which have NOT CHANGED    Details   aspirin 81 MG Chew Take 1 tablet (81 mg total) by mouth once daily.  Refills: 0      carvedilol (COREG) 6.25 MG tablet Take 1 tablet (6.25 mg total) by mouth 2 (two) times daily.  Qty: 60 tablet, Refills: 3      diazePAM (VALIUM) 5 MG tablet Take 1 tablet (5 mg total) by mouth every 8 (eight) hours as needed (muscle spasm).  Qty: 20 tablet, Refills: 0      fluticasone (FLONASE) 50 mcg/actuation nasal spray 1 spray by Each Nare route 2 (two) times daily as needed.  Qty: 15 g, Refills: 0      furosemide (LASIX) 40 MG tablet Take 1 tablet (40 mg total) by mouth 2 (two) times daily.  Qty: 60 tablet, Refills: 0      ibuprofen (ADVIL,MOTRIN) 800 MG tablet Take 1 tablet (800 mg total) by mouth 3 (three) times daily.  Qty: 90 tablet, Refills: 0      lisinopril (PRINIVIL,ZESTRIL) 5 MG tablet Take 1 tablet (5 mg total) by mouth once daily.  Qty: 30 tablet, Refills: 3      oxycodone-acetaminophen (PERCOCET) 5-325 mg per tablet Take 1 tablet by mouth every 6 (six) hours as needed for Pain.  Qty: 20 tablet, Refills: 0      potassium chloride 20 mEq TbSR Take 20 mEq by mouth 2 (two) times daily.  Qty: 60 tablet, Refills: 3             No discharge procedures on file.    Jose Fierro  02/20/2017  11:17 AM

## 2017-02-20 NOTE — PLAN OF CARE
Problem: Fluid Volume Excess (Adult,Obstetrics,Pediatric)  Goal: Balanced Intake/Output  Patient will demonstrate the desired outcomes by discharge/transition of care.   Outcome: Ongoing (interventions implemented as appropriate)  Nurse instructed pt to use urinal to keep an accurate count on urine output. Also instructed pt to count cups of water he drinks. Pt verbalized understanding and demonstrated understanding by using urinal throughout shift.

## 2017-02-20 NOTE — PLAN OF CARE
Problem: Physical Therapy Goal  Goal: Physical Therapy Goal  Outcome: Outcome(s) achieved Date Met:  02/20/17  Pt presents as independent with all transfers and amb without AD; O2 sats at rest with O2 at 1L~96%; rest on RA~95%; amb on RA~94%; after amb~95% and with 1-2 min rest, O2 sats 98% on RA; no further needs for acute PT services; will d/c PT.

## 2017-02-20 NOTE — PT/OT/SLP EVAL
"Occupational Therapy  Evaluation/Dc Summary     Randy Mcwilliams   MRN: [id number]   Admitting Diagnosis: Acute systolic congestive heart failure    OT Date of Treatment: 17   OT Start Time: 1034  OT Stop Time: 1051  OT Total Time (min): 17 min    Billable Minutes:  Evaluation 17    Diagnosis: Acute systolic congestive heart failure   The primary encounter diagnosis was Acute systolic congestive heart failure. Diagnoses of SOB (shortness of breath) and Congestive heart failure, unspecified congestive heart failure chronicity, unspecified congestive heart failure type were also pertinent to this visit.      Past Medical History   Diagnosis Date    Acute systolic congestive heart failure 2016     EF 20%      Past Surgical History   Procedure Laterality Date    Leg surgery             General Precautions: Standard, fall  Orthopedic Precautions: N/A  Braces:            Patient History:  Living Environment  Lives With: significant other  Living Arrangements: condominium  Home Accessibility:  (tub/shower combo)  Equipment Currently Used at Home: none    Prior level of function:   Bed Mobility/Transfers: independent  Grooming: independent  Bathing: independent  Upper Body Dressing: independent  Lower Body Dressing: independent  Toileting: independent  Home Management Skills: independent  Driving License: Yes  Mode of Transportation: Car  Occupation: Full time employment  Type of Occupation: Construction      Dominant hand: right    Subjective:  Communicated with nsg prior to session.  Pt states, " I'm alive! I was dying yesterday but now I'm fine!"   Chief Complaint: none stated   Patient/Family stated goals: return home     Pain Ratin/10                   Objective:       Cognitive Exam:  Oriented to: Person, Place, Time and Situation  Follows Commands/attention: Follows multistep  commands  Communication: clear/fluent  Memory:  No Deficits noted  Safety awareness/insight to disability: intact  Coping " skills/emotional control: Appropriate to situation    Visual/perceptual:  Intact    Physical Exam:  Postural examination/scapula alignment: Normal   Skin integrity: Visible skin intact  Edema: none     Sensation:   Intact    Upper Extremity Range of Motion:  Right Upper Extremity: WFL  Left Upper Extremity: WFL    Upper Extremity Strength:  Right Upper Extremity: WFL  Left Upper Extremity: WFL   Strength: good    Fine motor coordination:   Intact    Gross motor coordination: WFL    Functional Mobility:  Bed Mobility:  Scooting/Bridging: Independent  Supine to Sit: Independent    Transfers:  Sit <> Stand Assistance: Independent  Sit <> Stand Assistive Device: No Assistive Device  Bed <> Chair Technique: Stand Pivot  Bed <> Chair Transfer Assistance: Independent  Toilet Transfer Technique: Stand Pivot  Toilet Transfer Assistance: Independent  Toilet Transfer Assistive Device: No Assistive Device    Functional Ambulation: independent, no AD     Activities of Daily Living:    LE Dressing Level of Assistance: Independent    Grooming Position: Standing  Grooming Level of Assistance: Independent  Toileting Where Assessed: Toilet  Toileting Level of Assistance: Independent       Balance:   Static Sit: NORMAL: No deviations seen in posture held statically  Dynamic Sit: NORMAL: No deviations seen in posture held dynamically  Static Stand: NORMAL: No deviations seen in posture held statically  Dynamic stand: NORMAL: No deviations seen in posture held dynamically        AM-PAC 6 CLICK ADL  How much help from another person does this patient currently need?  1 = Unable, Total/Dependent Assistance  2 = A lot, Maximum/Moderate Assistance  3 = A little, Minimum/Contact Guard/Supervision  4 = None, Modified [place]/Independent    Putting on and taking off regular lower body clothing? : 4  Bathing (including washing, rinsing, drying)?: 4  Toileting, which includes using toilet, bedpan, or urinal? : 4  Putting on and taking  "off regular upper body clothing?: 4  Taking care of personal grooming such as brushing teeth?: 4  Eating meals?: 4  Total Score: 24    AM-PAC Raw Score CMS "G-Code Modifier Level of Impairment Assistance   6 % Total / Unable   7 - 9 CM 80 - 100% Maximal Assist   10 - 14 CL 60 - 80% Moderate Assist   15 - 19 CK 40 - 60% Moderate Assist   20 - 22 CJ 20 - 40% Minimal Assist   23 CI 1-20% SBA / CGA   24 CH 0% Independent/ Mod I       Patient left up in chair with all lines intact, call button in reach, nsg notified and family  present    Assessment:  Randy Mcwilliams is a 56 y.o. male with a medical diagnosis of Acute systolic congestive heart failure and presents with SOB. Pt performing ADLs and functional mobility independently. Pt's 02 94% with ambulation on RA- O2 remaining 94% or above with axs on RA. Pt with no further OT needs at this time. D/c OT     Rehab identified problem list/impairments: Rehab identified problem list/impairments: impaired endurance    Rehab potential is good.    Activity tolerance: Good    Discharge recommendations: Discharge Facility/Level Of Care Needs: home     Barriers to discharge: Barriers to Discharge: None    Equipment recommendations: none     GOALS:   Occupational Therapy Goals     Not on file      Multidisciplinary Problems (Resolved)        Problem: Occupational Therapy Goal    Goal Priority Disciplines Outcome Interventions   Occupational Therapy Goal   (Resolved)     OT, PT/OT Outcome(s) achieved              PLAN:  Patient to be seen  (D/c OT ) to address the above listed problems via    Plan of Care expires: 02/20/17  Plan of Care reviewed with: patient         Roberta R Nathan, OT  02/20/2017  "

## 2017-02-20 NOTE — PROGRESS NOTES
Progress Note    Admit Date: 2/19/2017   LOS: 1 day     SUBJECTIVE:     No issues overnight, in ECHO this morning.    Scheduled Meds:   aspirin  81 mg Oral Daily    diazePAM  5 mg Oral Q12H    enoxaparin  40 mg Subcutaneous Daily    famotidine (PF)  20 mg Intravenous Q12H    furosemide  60 mg Intravenous BID    lisinopril  5 mg Oral Daily    nifedipine 30 MG ORAL TR24  30 mg Oral Daily    potassium chloride  60 mEq Oral BID     Continuous Infusions:   PRN Meds:dextrose 50%, dextrose 50%, glucagon (human recombinant), glucose, glucose, hydrALAZINE, ondansetron    Review of patient's allergies indicates:  No Known Allergies    Review of Systems  Denies headache, fever, chills, cp, n/v, diarrhea, dysuria, and lower ext swelling      OBJECTIVE:     Vital Signs (Most Recent)  Temp: 97.7 °F (36.5 °C) (02/20/17 0800)  Pulse: 89 (02/20/17 0800)  Resp: 20 (02/20/17 0800)  BP: 125/84 (02/20/17 0800)  SpO2: (!) 92 % (02/20/17 0415)    Vital Signs Range (Last 24H):  Temp:  [97.7 °F (36.5 °C)-98.9 °F (37.2 °C)]   Pulse:  []   Resp:  [20-29]   BP: (112-160)/(71-97)   SpO2:  [92 %-98 %]     I & O (Last 24H):  Intake/Output Summary (Last 24 hours) at 02/20/17 0915  Last data filed at 02/20/17 0803   Gross per 24 hour   Intake              240 ml   Output             5890 ml   Net            -5650 ml     Physical Exam:  Gen: NAD  Head: NCAT  Heart: RRR, no m/g/r  Lungs: moderate crackles in bilateral lower lobes  Abd: snt nd  Ext: 2 + peripheral pulses        Assessment:       Pt is a 56 year old male with a history of HFrEF and hypertension who presents with acute on chronic CHF exacerbation     Plan:       1. Acute on Chronic exacerbation of HFrEF  - pt with chronic systolic HF with EF 20% on 1/2016  - on daily lasix 40mg at home  - states he has been out of his meds for the past few days, denies any chest pain/diaphoresis/n/v  - CT/CXR in ER consistent with pulmonary edema  - 60mg IV lasix x1 in ED diurese 2500  cc  - will continue on 60mg IV lasix BID and strict i/o  - ECHO tomorrow to monitor worsening/improvement of EF relative to 1/2016  - if persistent EF <30% will consult Cards and start on aldactone  - no previous workup for etiology of CHF, presumed 2/2 uncontrolled hypertension with LVH from echo on 1/2016     2. Hypertension  - pt on lisinopril 5 mg at home as well as carvedilol, started procardia due elevated BP  - will hold beta blocker in setting of acute exacerbation CHF but restart on discharge     3. AMELIA  - pt on valium as outpatient, will continue as to not precipitate withdrawal     4. Hyperbilirubinemia  - elevated t-bili of 1.6  - likely 2/2 to hepatic congestion, will get hep panel for completeness     ppx - lovenox for vte and pepcid for GI     dispo - f/u echo    Jose Fierro MD  PGY-2 FM  9:17 AM

## 2017-02-24 LAB
BACTERIA BLD CULT: NORMAL
BACTERIA BLD CULT: NORMAL

## 2017-03-11 PROBLEM — I50.9 CONGESTIVE HEART FAILURE: Status: ACTIVE | Noted: 2017-03-11

## 2017-07-02 ENCOUNTER — HOSPITAL ENCOUNTER (OUTPATIENT)
Facility: HOSPITAL | Age: 57
Discharge: HOME | End: 2017-07-03
Attending: EMERGENCY MEDICINE | Admitting: FAMILY MEDICINE

## 2017-07-02 DIAGNOSIS — I50.23 ACUTE ON CHRONIC SYSTOLIC (CONGESTIVE) HEART FAILURE: Primary | ICD-10-CM

## 2017-07-02 DIAGNOSIS — I50.21 ACUTE SYSTOLIC CONGESTIVE HEART FAILURE: ICD-10-CM

## 2017-07-02 DIAGNOSIS — J18.9 PNEUMONIA DUE TO INFECTIOUS ORGANISM, UNSPECIFIED LATERALITY, UNSPECIFIED PART OF LUNG: ICD-10-CM

## 2017-07-02 DIAGNOSIS — R06.02 SOB (SHORTNESS OF BREATH): ICD-10-CM

## 2017-07-02 DIAGNOSIS — I10 ESSENTIAL HYPERTENSION: Chronic | ICD-10-CM

## 2017-07-02 DIAGNOSIS — R06.02 SHORTNESS OF BREATH: ICD-10-CM

## 2017-07-02 PROBLEM — D72.829 LEUKOCYTOSIS: Status: ACTIVE | Noted: 2017-07-02

## 2017-07-02 LAB
ALBUMIN SERPL BCP-MCNC: 3.6 G/DL
ALP SERPL-CCNC: 86 U/L
ALT SERPL W/O P-5'-P-CCNC: 16 U/L
ANION GAP SERPL CALC-SCNC: 9 MMOL/L
APTT BLDCRRT: 29.2 SEC
AST SERPL-CCNC: 17 U/L
BASOPHILS # BLD AUTO: 0.01 K/UL
BASOPHILS NFR BLD: 0.1 %
BILIRUB SERPL-MCNC: 1.5 MG/DL
BNP SERPL-MCNC: 1033 PG/ML
BUN SERPL-MCNC: 12 MG/DL
CALCIUM SERPL-MCNC: 8.5 MG/DL
CHLORIDE SERPL-SCNC: 107 MMOL/L
CK MB SERPL-MCNC: 4.1 NG/ML
CK MB SERPL-RTO: 6.3 %
CK SERPL-CCNC: 65 U/L
CK SERPL-CCNC: 65 U/L
CO2 SERPL-SCNC: 20 MMOL/L
CREAT SERPL-MCNC: 0.8 MG/DL
DIFFERENTIAL METHOD: ABNORMAL
EOSINOPHIL # BLD AUTO: 0.1 K/UL
EOSINOPHIL NFR BLD: 0.3 %
ERYTHROCYTE [DISTWIDTH] IN BLOOD BY AUTOMATED COUNT: 14.1 %
EST. GFR  (AFRICAN AMERICAN): >60 ML/MIN/1.73 M^2
EST. GFR  (NON AFRICAN AMERICAN): >60 ML/MIN/1.73 M^2
GLUCOSE SERPL-MCNC: 113 MG/DL
HCT VFR BLD AUTO: 43.2 %
HGB BLD-MCNC: 15.2 G/DL
INR PPP: 1.1
LACTATE SERPL-SCNC: 1.2 MMOL/L
LYMPHOCYTES # BLD AUTO: 1.9 K/UL
LYMPHOCYTES NFR BLD: 12.4 %
MCH RBC QN AUTO: 27.8 PG
MCHC RBC AUTO-ENTMCNC: 35.2 %
MCV RBC AUTO: 79 FL
MONOCYTES # BLD AUTO: 0.9 K/UL
MONOCYTES NFR BLD: 5.8 %
NEUTROPHILS # BLD AUTO: 12.4 K/UL
NEUTROPHILS NFR BLD: 81.1 %
PLATELET # BLD AUTO: 193 K/UL
PMV BLD AUTO: 10.5 FL
POTASSIUM SERPL-SCNC: 3.8 MMOL/L
PROCALCITONIN SERPL IA-MCNC: 0.03 NG/ML
PROT SERPL-MCNC: 6.9 G/DL
PROTHROMBIN TIME: 11.6 SEC
RBC # BLD AUTO: 5.47 M/UL
SODIUM SERPL-SCNC: 136 MMOL/L
TROPONIN I SERPL DL<=0.01 NG/ML-MCNC: 0.04 NG/ML
TROPONIN I SERPL DL<=0.01 NG/ML-MCNC: 0.06 NG/ML
TROPONIN I SERPL DL<=0.01 NG/ML-MCNC: 0.06 NG/ML
WBC # BLD AUTO: 15.28 K/UL

## 2017-07-02 PROCEDURE — 96374 THER/PROPH/DIAG INJ IV PUSH: CPT

## 2017-07-02 PROCEDURE — G0378 HOSPITAL OBSERVATION PER HR: HCPCS

## 2017-07-02 PROCEDURE — 93005 ELECTROCARDIOGRAM TRACING: CPT

## 2017-07-02 PROCEDURE — 63600175 PHARM REV CODE 636 W HCPCS: Performed by: FAMILY MEDICINE

## 2017-07-02 PROCEDURE — 83605 ASSAY OF LACTIC ACID: CPT

## 2017-07-02 PROCEDURE — 93010 ELECTROCARDIOGRAM REPORT: CPT | Mod: ,,, | Performed by: INTERNAL MEDICINE

## 2017-07-02 PROCEDURE — 87040 BLOOD CULTURE FOR BACTERIA: CPT | Mod: 59

## 2017-07-02 PROCEDURE — 25000003 PHARM REV CODE 250: Performed by: FAMILY MEDICINE

## 2017-07-02 PROCEDURE — 96375 TX/PRO/DX INJ NEW DRUG ADDON: CPT

## 2017-07-02 PROCEDURE — 25000003 PHARM REV CODE 250: Performed by: EMERGENCY MEDICINE

## 2017-07-02 PROCEDURE — 84145 PROCALCITONIN (PCT): CPT

## 2017-07-02 PROCEDURE — 83880 ASSAY OF NATRIURETIC PEPTIDE: CPT

## 2017-07-02 PROCEDURE — 36415 COLL VENOUS BLD VENIPUNCTURE: CPT

## 2017-07-02 PROCEDURE — 80053 COMPREHEN METABOLIC PANEL: CPT

## 2017-07-02 PROCEDURE — 85610 PROTHROMBIN TIME: CPT

## 2017-07-02 PROCEDURE — 63600175 PHARM REV CODE 636 W HCPCS: Performed by: EMERGENCY MEDICINE

## 2017-07-02 PROCEDURE — 85730 THROMBOPLASTIN TIME PARTIAL: CPT

## 2017-07-02 PROCEDURE — 84484 ASSAY OF TROPONIN QUANT: CPT

## 2017-07-02 PROCEDURE — 84484 ASSAY OF TROPONIN QUANT: CPT | Mod: 91

## 2017-07-02 PROCEDURE — 94761 N-INVAS EAR/PLS OXIMETRY MLT: CPT

## 2017-07-02 PROCEDURE — 82553 CREATINE MB FRACTION: CPT

## 2017-07-02 PROCEDURE — 85025 COMPLETE CBC W/AUTO DIFF WBC: CPT

## 2017-07-02 PROCEDURE — 99285 EMERGENCY DEPT VISIT HI MDM: CPT | Mod: 25

## 2017-07-02 RX ORDER — IBUPROFEN 200 MG
24 TABLET ORAL
Status: DISCONTINUED | OUTPATIENT
Start: 2017-07-02 | End: 2017-07-03 | Stop reason: HOSPADM

## 2017-07-02 RX ORDER — CARVEDILOL 3.12 MG/1
3.12 TABLET ORAL 2 TIMES DAILY
Status: DISCONTINUED | OUTPATIENT
Start: 2017-07-02 | End: 2017-07-03 | Stop reason: HOSPADM

## 2017-07-02 RX ORDER — IBUPROFEN 200 MG
16 TABLET ORAL
Status: DISCONTINUED | OUTPATIENT
Start: 2017-07-02 | End: 2017-07-03 | Stop reason: HOSPADM

## 2017-07-02 RX ORDER — AMIODARONE HYDROCHLORIDE 200 MG/1
200 TABLET ORAL 2 TIMES DAILY
Status: DISCONTINUED | OUTPATIENT
Start: 2017-07-02 | End: 2017-07-03 | Stop reason: HOSPADM

## 2017-07-02 RX ORDER — ASPIRIN 325 MG
325 TABLET, DELAYED RELEASE (ENTERIC COATED) ORAL
Status: COMPLETED | OUTPATIENT
Start: 2017-07-02 | End: 2017-07-02

## 2017-07-02 RX ORDER — SPIRONOLACTONE 25 MG/1
25 TABLET ORAL DAILY
Status: DISCONTINUED | OUTPATIENT
Start: 2017-07-02 | End: 2017-07-03 | Stop reason: HOSPADM

## 2017-07-02 RX ORDER — LISINOPRIL 10 MG/1
10 TABLET ORAL DAILY
Status: DISCONTINUED | OUTPATIENT
Start: 2017-07-02 | End: 2017-07-03 | Stop reason: HOSPADM

## 2017-07-02 RX ORDER — FUROSEMIDE 10 MG/ML
40 INJECTION INTRAMUSCULAR; INTRAVENOUS
Status: COMPLETED | OUTPATIENT
Start: 2017-07-02 | End: 2017-07-02

## 2017-07-02 RX ORDER — DOXYCYCLINE HYCLATE 100 MG
100 TABLET ORAL EVERY 12 HOURS
Status: DISCONTINUED | OUTPATIENT
Start: 2017-07-02 | End: 2017-07-03 | Stop reason: HOSPADM

## 2017-07-02 RX ORDER — FUROSEMIDE 10 MG/ML
40 INJECTION INTRAMUSCULAR; INTRAVENOUS 2 TIMES DAILY
Status: DISCONTINUED | OUTPATIENT
Start: 2017-07-02 | End: 2017-07-03 | Stop reason: HOSPADM

## 2017-07-02 RX ORDER — ENOXAPARIN SODIUM 100 MG/ML
40 INJECTION SUBCUTANEOUS EVERY 24 HOURS
Status: DISCONTINUED | OUTPATIENT
Start: 2017-07-02 | End: 2017-07-03 | Stop reason: HOSPADM

## 2017-07-02 RX ORDER — GLUCAGON 1 MG
1 KIT INJECTION
Status: DISCONTINUED | OUTPATIENT
Start: 2017-07-02 | End: 2017-07-03 | Stop reason: HOSPADM

## 2017-07-02 RX ADMIN — AMIODARONE HYDROCHLORIDE 200 MG: 200 TABLET ORAL at 08:07

## 2017-07-02 RX ADMIN — FUROSEMIDE 40 MG: 10 INJECTION, SOLUTION INTRAMUSCULAR; INTRAVENOUS at 04:07

## 2017-07-02 RX ADMIN — ASPIRIN 325 MG: 325 TABLET, DELAYED RELEASE ORAL at 04:07

## 2017-07-02 RX ADMIN — FUROSEMIDE 40 MG: 10 INJECTION, SOLUTION INTRAMUSCULAR; INTRAVENOUS at 08:07

## 2017-07-02 RX ADMIN — ENOXAPARIN SODIUM 40 MG: 100 INJECTION SUBCUTANEOUS at 05:07

## 2017-07-02 RX ADMIN — AMIODARONE HYDROCHLORIDE 200 MG: 200 TABLET ORAL at 10:07

## 2017-07-02 RX ADMIN — LISINOPRIL 10 MG: 10 TABLET ORAL at 08:07

## 2017-07-02 RX ADMIN — SPIRONOLACTONE 25 MG: 25 TABLET ORAL at 08:07

## 2017-07-02 RX ADMIN — MOXIFLOXACIN HYDROCHLORIDE 400 MG: 400 INJECTION, SOLUTION INTRAVENOUS at 04:07

## 2017-07-02 RX ADMIN — CARVEDILOL 3.12 MG: 3.12 TABLET, FILM COATED ORAL at 08:07

## 2017-07-02 RX ADMIN — FUROSEMIDE 40 MG: 10 INJECTION, SOLUTION INTRAMUSCULAR; INTRAVENOUS at 05:07

## 2017-07-02 RX ADMIN — CARVEDILOL 3.12 MG: 3.12 TABLET, FILM COATED ORAL at 10:07

## 2017-07-02 RX ADMIN — DOXYCYCLINE HYCLATE 100 MG: 100 TABLET, COATED ORAL at 10:07

## 2017-07-02 NOTE — ED NOTES
Pt reports nausea passed and feels better; blood to lab; awaiting ready admit bed; pt reports no pain and less short of breath

## 2017-07-02 NOTE — PLAN OF CARE
Problem: Patient Care Overview  Goal: Plan of Care Review  Pt on room air with SpO2 94 %. No respiratory distress noted. Will continue to monitor.

## 2017-07-02 NOTE — SUBJECTIVE & OBJECTIVE
Past Medical History:   Diagnosis Date    Acute systolic congestive heart failure 1/7/2016    EF 20%    Hypertension        Past Surgical History:   Procedure Laterality Date    LEG SURGERY         Review of patient's allergies indicates:  No Known Allergies    No current facility-administered medications on file prior to encounter.      Current Outpatient Prescriptions on File Prior to Encounter   Medication Sig    amiodarone (PACERONE) 200 MG Tab Take 1 tablet (200 mg total) by mouth 2 (two) times daily.    aspirin 81 MG Chew Take 1 tablet (81 mg total) by mouth once daily.    carvedilol (COREG) 3.125 MG tablet Take 1 tablet (3.125 mg total) by mouth 2 (two) times daily.    diazePAM (VALIUM) 5 MG tablet Take 1 tablet (5 mg total) by mouth every 8 (eight) hours as needed (muscle spasm).    fluticasone (FLONASE) 50 mcg/actuation nasal spray 1 spray by Each Nare route 2 (two) times daily as needed.    furosemide (LASIX) 40 MG tablet Take 1 tablet (40 mg total) by mouth 2 (two) times daily.    ibuprofen (ADVIL,MOTRIN) 800 MG tablet Take 1 tablet (800 mg total) by mouth 3 (three) times daily.    lisinopril 10 MG tablet Take 1 tablet (10 mg total) by mouth once daily.    oxycodone-acetaminophen (PERCOCET) 5-325 mg per tablet Take 1 tablet by mouth every 6 (six) hours as needed for Pain.    spironolactone (ALDACTONE) 25 MG tablet Take 1 tablet (25 mg total) by mouth once daily.     Family History     None        Social History Main Topics    Smoking status: Never Smoker    Smokeless tobacco: Not on file    Alcohol use 1.2 oz/week     2 Cans of beer per week      Comment: occ, last beer was on 7/1/17    Drug use: No    Sexual activity: Not on file     Review of Systems   Constitutional: Negative.    HENT: Negative.    Respiratory: Positive for cough and shortness of breath. Negative for chest tightness and wheezing.    Cardiovascular: Negative.    Gastrointestinal: Negative.    Genitourinary: Negative.     Neurological: Negative.      Objective:     Vital Signs (Most Recent):  Temp: 98.6 °F (37 °C) (07/02/17 0421)  Pulse: 100 (07/02/17 0508)  Resp: (!) 23 (07/02/17 0508)  BP: (!) 144/95 (07/02/17 0508)  SpO2: 96 % (07/02/17 0508) Vital Signs (24h Range):  Temp:  [97.7 °F (36.5 °C)-98.6 °F (37 °C)] 98.6 °F (37 °C)  Pulse:  [] 100  Resp:  [22-28] 23  SpO2:  [96 %-99 %] 96 %  BP: (144-179)/() 144/95     Weight: 89.8 kg (198 lb)  Body mass index is 25.42 kg/m².    Physical Exam   Constitutional: He is oriented to person, place, and time. He appears well-developed and well-nourished. No distress.   HENT:   Head: Normocephalic and atraumatic.   Nose: Nose normal.   Mouth/Throat: No oropharyngeal exudate.   Eyes: Conjunctivae are normal. Right eye exhibits no discharge. Left eye exhibits no discharge.   Neck: Normal range of motion. Neck supple. No JVD present. No tracheal deviation present.   Cardiovascular: Normal rate, regular rhythm, normal heart sounds and intact distal pulses.  Exam reveals no gallop and no friction rub.    No murmur heard.  Pulmonary/Chest: Effort normal. No stridor. No respiratory distress. He has no wheezes. He has rales in the left lower field. He exhibits no tenderness.   Abdominal: Soft. Bowel sounds are normal. He exhibits no distension and no mass. There is no tenderness. There is no guarding.   Musculoskeletal: Normal range of motion. He exhibits no edema, tenderness or deformity.   Neurological: He is alert and oriented to person, place, and time.   Skin: Skin is warm and dry. No rash noted. He is not diaphoretic. No erythema. No pallor.        Significant Labs:   CBC:   Recent Labs  Lab 07/02/17  0324   WBC 15.28*   HGB 15.2   HCT 43.2        CMP:   Recent Labs  Lab 07/02/17  0324      K 3.8      CO2 20*   *   BUN 12   CREATININE 0.8   CALCIUM 8.5*   PROT 6.9   ALBUMIN 3.6   BILITOT 1.5*   ALKPHOS 86   AST 17   ALT 16   ANIONGAP 9   EGFRNONAA >60      Cardiac Markers:   Recent Labs  Lab 07/02/17  0324   BNP 1,033*     Lactic Acid: No results for input(s): LACTATE in the last 48 hours.    Significant Imaging: I have reviewed all pertinent imaging results/findings within the past 24 hours.

## 2017-07-02 NOTE — PLAN OF CARE
Chief Complaint   Patient presents with    Shortness of Breath       pt c/o SOB that began hours pta, hx fluid in lungs, states that he has been out of bp med for 2 weeks, pt also c/o dry cough. States similar episode in Feb. this year      Pt is independent with ADLs, no HH/HME, does not have a regular PCP but goes to a clinic at [facility] for check ups    [facility] gave Pt Prescription Drug Discount card       07/02/17 0498   Discharge Assessment   Assessment Type Discharge Planning Assessment   Confirmed/corrected address and phone number on facesheet? Yes   Assessment information obtained from? Patient   Expected Length of Stay (days) 2   Communicated expected length of stay with patient/caregiver yes   Prior to hospitilization cognitive status: Alert/Oriented   Prior to hospitalization functional status: Independent   Current cognitive status: Alert/Oriented   Current Functional Status: Independent   Arrived From home or self-care   Lives With significant other   Able to Return to Prior Arrangements yes   Is patient able to care for self after discharge? Yes   How many people do you have in your home that can help with your care after discharge? 1   Who are your caregiver(s) and their phone number(s)? SO:  Hemalatha Beltran  [phone]   Patient's perception of discharge disposition home or selfcare   Readmission Within The Last 30 Days no previous admission in last 30 days   Patient currently being followed by outpatient case management? No   Patient currently receives home health services? No   Does the patient currently use HME? No   Patient currently receives private duty nursing? No   Patient currently receives any other outside agency services? No   Equipment Currently Used at Home none   Do you have any problems affording any of your prescribed medications? No   Is the patient taking medications as prescribed? yes   Do you have any financial concerns preventing you from receiving the healthcare you  need? No   Does the patient have transportation to healthcare appointments? Yes   Transportation Available family or friend will provide   On Dialysis? No   Does the patient receive services at the Coumadin Clinic? No   Are there any open cases? No   Discharge Plan A Home   Discharge Plan B Home with family   Patient/Family In Agreement With Plan yes     Zeinab Nur RN Transitional Navigator  [phone]

## 2017-07-02 NOTE — HPI
55 yo male with pmhx of HTN and CHF with EF 20% (1/7/2016) presented to the ED with SOB that started few hours ago. He was at home resting and felt SOB but denies any CP, diaphoresis, fever chills, leg swelling, or hemoptysis. He has had similar episode in the past and was diagnosed with CHF. He was supposed to take home medications, but for the past 2 weeks he ran out of his meds and did not have money to buy them. He endorses having a good lifestyle as he is active in sports and healthy diet and denies smoking and illicit drugs. He also denies fever, chills, nausea and vomiting. He also noticed some swelling in her lower extremities along with mild cough. He admits to be drinking lot of water lately. He denies any recent sickness, sick contact or travel.

## 2017-07-02 NOTE — ED PROVIDER NOTES
"Encounter Date: 7/2/2017       History     Chief Complaint   Patient presents with    Shortness of Breath     pt c/o SOB that began hours pta, hx fluid in lungs, states that he has been out of bp med for 2 weeks, pt also c/o dry cough. States similar episode in Feb. this year     Randy Mcwilliams is a 56 y.o. male with a medical history  of HTN and acute systolic congestive heart failure (EF 20% 1/7/2016) who presented with SOB that started 2 hours ago. Sudden in onset, but similar in presentation to his previous admission last year. Denies chest pain, leg swelling or hemoptysis. No history of blood clots. Denies fevers/chills, nausea or vomiting. Reports it feels like he has "pnuemonia" but denies cough, fevers or chills.  He has been out of his lisinopril x 2 weeks.      The history is provided by the patient.     Review of patient's allergies indicates:  No Known Allergies  Past Medical History:   Diagnosis Date    Acute systolic congestive heart failure 1/7/2016    EF 20%    Hypertension      Past Surgical History:   Procedure Laterality Date    LEG SURGERY       No family history on file.  Social History   Substance Use Topics    Smoking status: Never Smoker    Smokeless tobacco: Not on file    Alcohol use 1.2 oz/week     2 Cans of beer per week      Comment: occ, last beer was on 7/1/17     Review of Systems   Respiratory: Positive for shortness of breath.    Cardiovascular: Negative for chest pain and leg swelling.   Gastrointestinal: Negative for abdominal distention.   Musculoskeletal: Negative.    All other systems reviewed and are negative.      Physical Exam     Initial Vitals [07/02/17 0308]   BP Pulse Resp Temp SpO2   (!) 179/104 (!) 53 (!) 25 97.7 °F (36.5 °C) 99 %      MAP       129         Physical Exam    Nursing note and vitals reviewed.  Constitutional: He appears well-developed and well-nourished.   HENT:   Head: Normocephalic and atraumatic.   Eyes: EOM are normal. Pupils are equal, round, " and reactive to light.   Neck: Normal range of motion.   Cardiovascular:   tachycardic   Pulmonary/Chest:   Crackles bibasilar, tachypneic   Abdominal: Soft. Bowel sounds are normal.   Musculoskeletal: Normal range of motion.   Bilateral LE swelling, non pitting   Neurological: He is alert and oriented to person, place, and time.   Skin: Skin is warm and dry.   Psychiatric: He has a normal mood and affect. Thought content normal.         ED Course   Procedures  Labs Reviewed   CBC W/ AUTO DIFFERENTIAL - Abnormal; Notable for the following:        Result Value    WBC 15.28 (*)     MCV 79 (*)     Gran # 12.4 (*)     Gran% 81.1 (*)     Lymph% 12.4 (*)     All other components within normal limits   COMPREHENSIVE METABOLIC PANEL - Abnormal; Notable for the following:     CO2 20 (*)     Glucose 113 (*)     Calcium 8.5 (*)     Total Bilirubin 1.5 (*)     All other components within normal limits   TROPONIN I - Abnormal; Notable for the following:     Troponin I 0.060 (*)     All other components within normal limits   B-TYPE NATRIURETIC PEPTIDE - Abnormal; Notable for the following:     BNP 1,033 (*)     All other components within normal limits   CK-MB - Abnormal; Notable for the following:     MB% 6.3 (*)     All other components within normal limits   CULTURE, BLOOD   CULTURE, BLOOD   CK   APTT   PROTIME-INR   LACTIC ACID, PLASMA   PROCALCITONIN     EKG Readings: (Independently Interpreted)   NSR with LVH and left atrial enlargement, Q waves anteriorly no ST changes          Medical Decision Making:   Initial Assessment:   58 yo M with SOB this evening, dry cough, has been out of his BP meds. Has history of systolic heart failure  Differential Diagnosis:   Hypertensive pulm edema, NSTEMI, PNA, also consider differential of PE-- patient did have an elevated DDIMer in February, and a negative CTA  Clinical Tests:   Lab Tests: Ordered and Reviewed  Radiological Study: Ordered and Reviewed  ED Management:  Atient  clinically tells me his symptoms feel like when he had a pneumonia, he was also diagnosed with heart failure at the time.  His chest x-ray shows a small consolidation possible pneumonia he does have a cough and leukocytosis.  He is covered with moxifloxacin.  He has not been hospitalized in the last few months and has no risk factors for MRSA.  Patienthas an elevated BNP, consistent with his diagnosis of systolic heart failure, and medication noncompliance.  I have not further worked up for pulmonary embolism.  My suspicion for this is low.  Patient has had a negative CT PE protocol done in February with a similar presentation.  He has no unilateral leg swelling or hemoptysis. He was given aspirin for elevated troponin.  I discussed this with the admitting team, he will be admitted on telemetry to the [facility] family medicine team.                    ED Course     Clinical Impression:   The primary encounter diagnosis was Acute on chronic systolic (congestive) heart failure. Diagnoses of Shortness of breath, SOB (shortness of breath), and Pneumonia due to infectious organism, unspecified laterality, unspecified part of lung were also pertinent to this visit.                           Minoo Hurley MD  07/02/17 0557

## 2017-07-02 NOTE — ASSESSMENT & PLAN NOTE
EF 20% 02/2017  CXR: Increased interstitial attenuation with patchy increased parenchymal attenuation, suggesting chronic change with superimposed edema  BNP: 1033  Pt was on lasix 40 mg daily but has not had it for 2 weeks  Will start 40 lasix IV BID  Strict I/O's   Daily weights

## 2017-07-02 NOTE — H&P
[facility] Hospital Medicine  History & Physical    Patient Name: Randy Mcwilliams  MRN: [id number]  Admission Date: 7/2/2017  Attending Physician: Dr. Carrillo  Primary Care Provider: Primary Doctor No         Patient information was obtained from patient and ER records.     Subjective:     Principal Problem: SOB    Chief Complaint:   Chief Complaint   Patient presents with    Shortness of Breath     pt c/o SOB that began hours pta, hx fluid in lungs, states that he has been out of bp med for 2 weeks, pt also c/o dry cough. States similar episode in Feb. this year        HPI: 55 yo male with pmhx of HTN and CHF with EF 20% (1/7/2016) presented to the ED with SOB that started few hours ago. He was at home resting and felt SOB but denies any CP, diaphoresis, fever chills, leg swelling, or hemoptysis. He has had similar episode in the past and was diagnosed with CHF. He was supposed to take home medications, but for the past 2 weeks he ran out of his meds and did not have money to buy them. He endorses having a good lifestyle as he is active in sports and healthy diet and denies smoking and illicit drugs. He also denies fever, chills, nausea and vomiting. He also noticed some swelling in her lower extremities along with mild cough. He admits to be drinking lot of water lately. He denies any recent sickness, sick contact or travel.         No new subjective & objective note has been filed under this hospital service since the last note was generated.    Past Medical History:   Diagnosis Date    Acute systolic congestive heart failure 1/7/2016     EF 20%    Hypertension                 Past Surgical History:   Procedure Laterality Date    LEG SURGERY             Review of patient's allergies indicates:  No Known Allergies     No current facility-administered medications on file prior to encounter.            Current Outpatient Prescriptions on File Prior to Encounter   Medication Sig    amiodarone  (PACERONE) 200 MG Tab Take 1 tablet (200 mg total) by mouth 2 (two) times daily.    aspirin 81 MG Chew Take 1 tablet (81 mg total) by mouth once daily.    carvedilol (COREG) 3.125 MG tablet Take 1 tablet (3.125 mg total) by mouth 2 (two) times daily.    diazePAM (VALIUM) 5 MG tablet Take 1 tablet (5 mg total) by mouth every 8 (eight) hours as needed (muscle spasm).    fluticasone (FLONASE) 50 mcg/actuation nasal spray 1 spray by Each Nare route 2 (two) times daily as needed.    furosemide (LASIX) 40 MG tablet Take 1 tablet (40 mg total) by mouth 2 (two) times daily.    ibuprofen (ADVIL,MOTRIN) 800 MG tablet Take 1 tablet (800 mg total) by mouth 3 (three) times daily.    lisinopril 10 MG tablet Take 1 tablet (10 mg total) by mouth once daily.    oxycodone-acetaminophen (PERCOCET) 5-325 mg per tablet Take 1 tablet by mouth every 6 (six) hours as needed for Pain.    spironolactone (ALDACTONE) 25 MG tablet Take 1 tablet (25 mg total) by mouth once daily.          Family History      None                 Social History Main Topics    Smoking status: Never Smoker    Smokeless tobacco: Not on file    Alcohol use 1.2 oz/week       2 Cans of beer per week         Comment: occ, last beer was on 7/1/17    Drug use: No    Sexual activity: Not on file      Review of Systems   Constitutional: Negative.    HENT: Negative.    Respiratory: Positive for cough and shortness of breath. Negative for chest tightness and wheezing.    Cardiovascular: Negative.    Gastrointestinal: Negative.    Genitourinary: Negative.    Neurological: Negative.       Objective:      Vital Signs (Most Recent):  Temp: 98.6 °F (37 °C) (07/02/17 0421)  Pulse: 100 (07/02/17 0508)  Resp: (!) 23 (07/02/17 0508)  BP: (!) 144/95 (07/02/17 0508)  SpO2: 96 % (07/02/17 0508) Vital Signs (24h Range):  Temp:  [97.7 °F (36.5 °C)-98.6 °F (37 °C)] 98.6 °F (37 °C)  Pulse:  [] 100  Resp:  [22-28] 23  SpO2:  [96 %-99 %] 96 %  BP: (144-179)/() 144/95       Weight: 89.8 kg (198 lb)  Body mass index is 25.42 kg/m².     Physical Exam   Constitutional: He is oriented to person, place, and time. He appears well-developed and well-nourished. No distress.   HENT:   Head: Normocephalic and atraumatic.   Nose: Nose normal.   Mouth/Throat: No oropharyngeal exudate.   Eyes: Conjunctivae are normal. Right eye exhibits no discharge. Left eye exhibits no discharge.   Neck: Normal range of motion. Neck supple. No JVD present. No tracheal deviation present.   Cardiovascular: Normal rate, regular rhythm, normal heart sounds and intact distal pulses.  Exam reveals no gallop and no friction rub.    No murmur heard.  Pulmonary/Chest: Effort normal. No stridor. No respiratory distress. He has no wheezes. He has rales in the left lower field. He exhibits no tenderness.   Abdominal: Soft. Bowel sounds are normal. He exhibits no distension and no mass. There is no tenderness. There is no guarding.   Musculoskeletal: Normal range of motion. He exhibits no edema, tenderness or deformity.   Neurological: He is alert and oriented to person, place, and time.   Skin: Skin is warm and dry. No rash noted. He is not diaphoretic. No erythema. No pallor.         Significant Labs:   CBC:   Recent Labs  Lab 07/02/17  0324   WBC 15.28*   HGB 15.2   HCT 43.2         CMP:   Recent Labs  Lab 07/02/17  0324      K 3.8      CO2 20*   *   BUN 12   CREATININE 0.8   CALCIUM 8.5*   PROT 6.9   ALBUMIN 3.6   BILITOT 1.5*   ALKPHOS 86   AST 17   ALT 16   ANIONGAP 9   EGFRNONAA >60      Cardiac Markers:   Recent Labs  Lab 07/02/17  0324   BNP 1,033*      Lactic Acid: No results for input(s): LACTATE in the last 48 hours.     Significant Imaging: I have reviewed all pertinent imaging results/findings within the past 24 hours.      Assessment/Plan:     Acute on chronic systolic (congestive) heart failure    EF 20% 02/2017  CXR: Increased interstitial attenuation with patchy increased  parenchymal attenuation, suggesting chronic change with superimposed edema  BNP: 1033  Pt was on lasix 40 mg daily but has not had it for 2 weeks  Will start 40 lasix IV BID  Strict I/O's   Daily weights          Leukocytosis    Initial WBC 15.28 but no source of infection  Vitals stable  Will obtain LA, procalc, and blood culture.   Will consider antibiotics if any of the above is positive.   CXR suggestive of developing PNA, will start Avelox.           SOB (shortness of breath)    With no chest pain, diaphoresis, nausea and vomiting.   No history of stasis or any other medication use  Likely secondary to fluid overload  Initial Trop elevated will trend them x 3  Will obtain D-dimer  Will trend EKG            Essential hypertension    Continue home medication lisinopril and spironolactone   Will monitor bp.           VTE Risk Mitigation         Ordered     enoxaparin injection 40 mg  Daily     Route:  Subcutaneous        07/02/17 0519     Medium Risk of VTE  Once      07/02/17 0613        Cong Rivera MD  Department of Hospital Medicine   [facility]

## 2017-07-02 NOTE — ASSESSMENT & PLAN NOTE
With no chest pain, diaphoresis, nausea and vomiting.   No history of stasis or any other medication use  Likely secondary to fluid overload  Initial Trop elevated will trend them x 3  Will obtain D-dimer  Will trend EKG

## 2017-07-02 NOTE — ED NOTES
Pt transported on stretcher with nurse to telemetry floor, cardiac monitor in place, NAD noted, denies pain

## 2017-07-02 NOTE — ASSESSMENT & PLAN NOTE
Initial WBC 15.28 but no source of infection  Vitals stable  Will obtain LA, procalc, and blood culture.   Will consider antibiotics if any of the above is positive.

## 2017-07-02 NOTE — PLAN OF CARE
Pt arrived to floor, placed on telemetry. Bed alarm on, call light in reach, fall precautions in place. VSS. Will continue to monitor and report to oncoming nurse.

## 2017-07-02 NOTE — PLAN OF CARE
Problem: Patient Care Overview  Goal: Plan of Care Review  Outcome: Ongoing (interventions implemented as appropriate)  Pt safety maintained. Bed in low and locked position. Bed alarm on. Pt remains tele NSR on monitor. Pt receiving lasix IV BID.

## 2017-07-02 NOTE — ASSESSMENT & PLAN NOTE
Initial WBC 15.28 but no source of infection  Vitals stable  Will obtain LA, procalc, and blood culture.   Will consider antibiotics if any of the above is positive.   CXR suggestive of developing PNA, will start Avelox.

## 2017-07-03 VITALS
RESPIRATION RATE: 18 BRPM | OXYGEN SATURATION: 98 % | BODY MASS INDEX: 26.12 KG/M2 | TEMPERATURE: 98 F | SYSTOLIC BLOOD PRESSURE: 145 MMHG | HEIGHT: 74 IN | HEART RATE: 85 BPM | WEIGHT: 203.5 LBS | DIASTOLIC BLOOD PRESSURE: 80 MMHG

## 2017-07-03 LAB
ALBUMIN SERPL BCP-MCNC: 3.3 G/DL
ALP SERPL-CCNC: 76 U/L
ALT SERPL W/O P-5'-P-CCNC: 13 U/L
ANION GAP SERPL CALC-SCNC: 7 MMOL/L
AST SERPL-CCNC: 11 U/L
BASOPHILS # BLD AUTO: 0.01 K/UL
BASOPHILS NFR BLD: 0.1 %
BILIRUB SERPL-MCNC: 1 MG/DL
BUN SERPL-MCNC: 16 MG/DL
CALCIUM SERPL-MCNC: 8.9 MG/DL
CHLORIDE SERPL-SCNC: 107 MMOL/L
CO2 SERPL-SCNC: 23 MMOL/L
CREAT SERPL-MCNC: 0.8 MG/DL
DIFFERENTIAL METHOD: ABNORMAL
EOSINOPHIL # BLD AUTO: 0 K/UL
EOSINOPHIL NFR BLD: 0.4 %
ERYTHROCYTE [DISTWIDTH] IN BLOOD BY AUTOMATED COUNT: 14.4 %
EST. GFR  (AFRICAN AMERICAN): >60 ML/MIN/1.73 M^2
EST. GFR  (NON AFRICAN AMERICAN): >60 ML/MIN/1.73 M^2
GLUCOSE SERPL-MCNC: 114 MG/DL
HCT VFR BLD AUTO: 44.7 %
HGB BLD-MCNC: 15.6 G/DL
LYMPHOCYTES # BLD AUTO: 1.9 K/UL
LYMPHOCYTES NFR BLD: 17.6 %
MAGNESIUM SERPL-MCNC: 1.9 MG/DL
MCH RBC QN AUTO: 27.6 PG
MCHC RBC AUTO-ENTMCNC: 34.9 %
MCV RBC AUTO: 79 FL
MONOCYTES # BLD AUTO: 0.8 K/UL
MONOCYTES NFR BLD: 7.6 %
NEUTROPHILS # BLD AUTO: 8.2 K/UL
NEUTROPHILS NFR BLD: 74 %
PHOSPHATE SERPL-MCNC: 3.2 MG/DL
PLATELET # BLD AUTO: 201 K/UL
PMV BLD AUTO: 11 FL
POTASSIUM SERPL-SCNC: 3.6 MMOL/L
PROT SERPL-MCNC: 6.5 G/DL
RBC # BLD AUTO: 5.66 M/UL
SODIUM SERPL-SCNC: 137 MMOL/L
WBC # BLD AUTO: 11.02 K/UL

## 2017-07-03 PROCEDURE — G0378 HOSPITAL OBSERVATION PER HR: HCPCS

## 2017-07-03 PROCEDURE — 94761 N-INVAS EAR/PLS OXIMETRY MLT: CPT

## 2017-07-03 PROCEDURE — 25000003 PHARM REV CODE 250: Performed by: FAMILY MEDICINE

## 2017-07-03 PROCEDURE — 85025 COMPLETE CBC W/AUTO DIFF WBC: CPT

## 2017-07-03 PROCEDURE — 84100 ASSAY OF PHOSPHORUS: CPT

## 2017-07-03 PROCEDURE — 83735 ASSAY OF MAGNESIUM: CPT

## 2017-07-03 PROCEDURE — 36415 COLL VENOUS BLD VENIPUNCTURE: CPT

## 2017-07-03 PROCEDURE — 80053 COMPREHEN METABOLIC PANEL: CPT

## 2017-07-03 PROCEDURE — 63600175 PHARM REV CODE 636 W HCPCS: Performed by: FAMILY MEDICINE

## 2017-07-03 RX ORDER — AMIODARONE HYDROCHLORIDE 200 MG/1
200 TABLET ORAL 2 TIMES DAILY
Qty: 180 TABLET | Refills: 3 | Status: SHIPPED | OUTPATIENT
Start: 2017-07-03 | End: 2018-07-03

## 2017-07-03 RX ORDER — NAPROXEN SODIUM 220 MG/1
81 TABLET, FILM COATED ORAL DAILY
Qty: 90 TABLET | Refills: 3 | Status: SHIPPED | OUTPATIENT
Start: 2017-07-03 | End: 2018-07-03

## 2017-07-03 RX ORDER — SPIRONOLACTONE 25 MG/1
25 TABLET ORAL DAILY
Qty: 90 TABLET | Refills: 3 | Status: SHIPPED | OUTPATIENT
Start: 2017-07-03 | End: 2018-07-03

## 2017-07-03 RX ORDER — CARVEDILOL 3.12 MG/1
3.12 TABLET ORAL 2 TIMES DAILY
Qty: 180 TABLET | Refills: 1 | Status: SHIPPED | OUTPATIENT
Start: 2017-07-03

## 2017-07-03 RX ORDER — LISINOPRIL 10 MG/1
10 TABLET ORAL DAILY
Qty: 90 TABLET | Refills: 3 | Status: SHIPPED | OUTPATIENT
Start: 2017-07-03 | End: 2018-07-03

## 2017-07-03 RX ORDER — DOXYCYCLINE HYCLATE 100 MG
100 TABLET ORAL EVERY 12 HOURS
Qty: 16 TABLET | Refills: 0 | Status: SHIPPED | OUTPATIENT
Start: 2017-07-03 | End: 2017-07-11

## 2017-07-03 RX ORDER — FUROSEMIDE 40 MG/1
40 TABLET ORAL 2 TIMES DAILY
Qty: 180 TABLET | Refills: 3 | Status: SHIPPED | OUTPATIENT
Start: 2017-07-03 | End: 2018-07-03

## 2017-07-03 RX ADMIN — CARVEDILOL 3.12 MG: 3.12 TABLET, FILM COATED ORAL at 08:07

## 2017-07-03 RX ADMIN — DOXYCYCLINE HYCLATE 100 MG: 100 TABLET, COATED ORAL at 08:07

## 2017-07-03 RX ADMIN — AMIODARONE HYDROCHLORIDE 200 MG: 200 TABLET ORAL at 08:07

## 2017-07-03 RX ADMIN — LISINOPRIL 10 MG: 10 TABLET ORAL at 08:07

## 2017-07-03 RX ADMIN — FUROSEMIDE 40 MG: 10 INJECTION, SOLUTION INTRAMUSCULAR; INTRAVENOUS at 08:07

## 2017-07-03 NOTE — PLAN OF CARE
Problem: Patient Care Overview  Goal: Plan of Care Review  Outcome: Ongoing (interventions implemented as appropriate)  Pt on RA with sats of 98% Will continue to monitor.

## 2017-07-03 NOTE — PLAN OF CARE
07/03/17 1442   Final Note   Assessment Type Final Discharge Note   Discharge Disposition Home   Discharge planning education complete? Yes   Hospital Follow Up  Appt(s) scheduled? Yes   Discharge plans and expectations educations in teach back method with documentation complete? Yes   Offered [facility] -- Bedside Delivery? Yes   Referral to Outpatient Case Management complete? No   Referral to / orders for Home Health Complete? No   30 day supply of medicines given at discharge, if documented non-compliance / non-adherence? No   Any social issues identified prior to discharge? No   Did you assess the readiness or willingness of the family or caregiver to support self management of care? No   Right Care Referral Info   Post Acute Recommendation No Care

## 2017-07-03 NOTE — DISCHARGE SUMMARY
Discharge Summary      Admit Date: 7/2/2017    Discharge Date and Time: 07/03/2017    Attending Physician: Lenny Carrillo MD     Discharge Physician: Suma Tamayo    Principal Diagnoses: Acute on chronic systolic (congestive) heart failure  The primary encounter diagnosis was Acute on chronic systolic (congestive) heart failure. Diagnoses of Shortness of breath, SOB (shortness of breath), Pneumonia due to infectious organism, unspecified laterality, unspecified part of lung, Acute systolic congestive heart failure, and Essential hypertension were also pertinent to this visit.    Discharged Condition: stable    Hospital Course: Randy Mcwilliams is a 57 y.o. male with pmh of Acute systolic congestive heart failure with EF of 20% (1/7/2016) and Hypertension. who presented with Acute on chronic systolic (congestive) heart failure. Pt presented to the ED c/o SOB. Initial workup showed leukocytosis 15.28, elevated troponin 0.060, and elevated BNP 1,033. EKG showed NSR with LVH and left atrial enlargement, no ST changes. CXR showed Increased interstitial attenuation with patchy increased parenchymal attenuation, suggesting chronic change with superimposed edema. Pt started on Avelox and Lasix. Lactic acid 1.2, Procalcitonin 0.03. Blood cultures no growth to date. EKG on 7/3 showed prolonged QTc. Pt switched from Avelox to Doxy. WBC improved to 11.02. Pt's fluid output was 5 L since admission. Patient reported significant improvement of symptoms. Troponin trended down from 0.060 to 0.045. EKG stable. Pt discharged home with 90 day supply of home medications.     Consults: None    Disposition: Home or Self Care    Patient Instructions:   Current Discharge Medication List      START taking these medications    Details   doxycycline (VIBRA-TABS) 100 MG tablet Take 1 tablet (100 mg total) by mouth every 12 (twelve) hours.  Qty: 16 tablet, Refills: 0         CONTINUE these medications which have CHANGED    Details    amiodarone (PACERONE) 200 MG Tab Take 1 tablet (200 mg total) by mouth 2 (two) times daily.  Qty: 180 tablet, Refills: 3      aspirin 81 MG Chew Take 1 tablet (81 mg total) by mouth once daily.  Qty: 90 tablet, Refills: 3      carvedilol (COREG) 3.125 MG tablet Take 1 tablet (3.125 mg total) by mouth 2 (two) times daily.  Qty: 180 tablet, Refills: 1    Associated Diagnoses: SOB (shortness of breath); Acute systolic congestive heart failure; Essential hypertension      furosemide (LASIX) 40 MG tablet Take 1 tablet (40 mg total) by mouth 2 (two) times daily.  Qty: 180 tablet, Refills: 3    Associated Diagnoses: SOB (shortness of breath); Acute systolic congestive heart failure; Essential hypertension      lisinopril 10 MG tablet Take 1 tablet (10 mg total) by mouth once daily.  Qty: 90 tablet, Refills: 3    Associated Diagnoses: SOB (shortness of breath); Acute systolic congestive heart failure; Essential hypertension      spironolactone (ALDACTONE) 25 MG tablet Take 1 tablet (25 mg total) by mouth once daily.  Qty: 90 tablet, Refills: 3    Associated Diagnoses: SOB (shortness of breath); Acute systolic congestive heart failure; Essential hypertension         CONTINUE these medications which have NOT CHANGED    Details   diazePAM (VALIUM) 5 MG tablet Take 1 tablet (5 mg total) by mouth every 8 (eight) hours as needed (muscle spasm).  Qty: 20 tablet, Refills: 0      fluticasone (FLONASE) 50 mcg/actuation nasal spray 1 spray by Each Nare route 2 (two) times daily as needed.  Qty: 15 g, Refills: 0      ibuprofen (ADVIL,MOTRIN) 800 MG tablet Take 1 tablet (800 mg total) by mouth 3 (three) times daily.  Qty: 90 tablet, Refills: 0      oxycodone-acetaminophen (PERCOCET) 5-325 mg per tablet Take 1 tablet by mouth every 6 (six) hours as needed for Pain.  Qty: 20 tablet, Refills: 0               Discharge Procedure Orders  Diet general     Activity as tolerated     Call MD for:  temperature >100.4     Call MD for:   persistent nausea and vomiting or diarrhea     Call MD for:  severe uncontrolled pain     Call MD for:  redness, tenderness, or signs of infection (pain, swelling, redness, odor or green/yellow discharge around incision site)     Call MD for:  difficulty breathing or increased cough     Call MD for:  severe persistent headache     Call MD for:  worsening rash     Call MD for:  persistent dizziness, light-headedness, or visual disturbances     Call MD for:  increased confusion or weakness         Suma Tamayo  07/03/2017  12:16 PM

## 2017-07-03 NOTE — MEDICAL/APP STUDENT
HPI: This 57 y.o. male with PMHx of CHF and HTN was presented to the hospital for SOB. Pt reports onset of SOB and nonproductive cough 3 days ago. He denies associated chest pain, edema, abdominal pain, fever, chills, nausea, vomiting, urinary frequency, orthopnea, paroxysmal nocturnal dyspnea. He reports no difficulty with ambulation. He states he sleeps on 1 pillow per night. He states he continues to walk approximately 30-45 minutes per day. He states he ran out of his prescribed Lisinopril 10 mg 2 weeks ago but reports he is normally compliant. He states he had a similar episode of SOB in February 2017 and was diagnosed with pneumonia. He reports improvement of symptoms with hospital treatment. No complaints today.     ROS:  General: negative for fever, chills, fatigue  Eyes: negative for vision changes, eye pain  Cardio: negative for chest pain, edema, palpitations  Pulm: positive for SOB, cough  Abdomen: negative for abdominal pain, nausea, vomiting  Gu: negative for urinary frequency, hematuria  Musc: negative for arthralgias  Skin: negative for color changes, rash  Neuro: negative for headache, dizziness, weakness, numbness    PE:  General: well-developed, well-nourished, no acute distress  Head: normocephalic, atraumatic  Neck: supple, no thyromegaly  Cardio: RRR, no murmurs, intact distal pulses  Pulm: normal effort. No wheezing, rales, or rhonchi  Abdomen: soft, nontender. No masses, guarding.   Musc: no edema. Normal ROM  Neuro: AAOx3    Assessment:  CHF    Plan:  CHF  - Lisinopril 10 mg x 90 days   - follow-up with PCP

## 2017-07-03 NOTE — PLAN OF CARE
Problem: Fall Risk (Adult)  Goal: Absence of Falls  Patient will demonstrate the desired outcomes by discharge/transition of care.   Outcome: Ongoing (interventions implemented as appropriate)  PT LYING IN BED AWAKE, NO DISTRESS NOTED. FAMILY AT BEDSIDE. CALL BELL WITHIN REACH, PT IS ABLE TO MAKE NEEDS KNOWN. WILL CONTINUE TO MONITOR. PT REPORTS THE NEED TO CALL FOR ASSISTANCE BEFORE AMBULATING.

## 2017-07-03 NOTE — PROGRESS NOTES
PGY-1 Progress Note    Hospital Stay Day 0    55 yo male with pmhx of HTN and CHF with EF 20% (2016) admitted for CHF exacerbation vs PNA vs multifactorial.    Subjective: Patient seen and examined. Patient states that breathing is improved. Denies any CP, SOB, N/V/D, headaches, fever or chills.       Scheduled Meds:   amiodarone  200 mg Oral BID    carvedilol  3.125 mg Oral BID    doxycycline  100 mg Oral Q12H    enoxaparin  40 mg Subcutaneous Daily    furosemide  40 mg Intravenous BID    lisinopril  10 mg Oral Daily    spironolactone  25 mg Oral Daily     Continuous Infusions:   PRN Meds:dextrose 50%, dextrose 50%, glucagon (human recombinant), glucose, glucose    Review of patient's allergies indicates:  No Known Allergies    Objectives:     Vitals(Most Recent)      BP  Min: 114/66  Max: 145/80  Temp  Av.2 °F (36.8 °C)  Min: 97.7 °F (36.5 °C)  Max: 98.6 °F (37 °C)  Pulse  Av.4  Min: 70  Max: 96  Resp  Av.2  Min: 18  Max: 19  SpO2  Av.2 %  Min: 94 %  Max: 97 %  Weight  Av.3 kg (203 lb 7.8 oz)  Min: 92.3 kg (203 lb 7.8 oz)  Max: 92.3 kg (203 lb 7.8 oz)             Vitals([id number])  Temp:  [97.7 °F (36.5 °C)-98.6 °F (37 °C)]   Pulse:  [70-96]   Resp:  [18-19]   BP: (114-145)/(66-89)   SpO2:  [94 %-97 %]     I & O([id number])    Intake/Output Summary (Last 24 hours) at 17 0920  Last data filed at 17 0731   Gross per 24 hour   Intake                0 ml   Output             2150 ml   Net            -2150 ml       General: AAOx3. NAD.  HEENT: NCAT. PERRLA. EOMI.   Neck: Supple. No JVD. No LAD.    CV: RRR. NL S1/S2. No M/R/G.   Chest: NL effort. CTAB. No R/R/W.   Abd: +BS x 4. Soft. ND/NT. No rebound or guarding.   Ext: No C/C/E. Peripheral pulses intact. NL ROM.   Skin: Intact. No rash. No lesions.   Neuro: CN II-XII intact. No focal deficit. Strength 5/5 throughout. Sensation intact.   Psych: Good judgement and reason. No A/V hallucinations. NL affect. No abnormal behaviors  noted    LABS  CBC    Recent Labs  Lab 07/02/17  0324 07/03/17  0520   WBC 15.28* 11.02   RBC 5.47 5.66   HGB 15.2 15.6   HCT 43.2 44.7    201   MCV 79* 79*   MCH 27.8 27.6   MCHC 35.2 34.9     BMP    Recent Labs  Lab 07/02/17  0324 07/03/17  0520    137   K 3.8 3.6   CO2 20* 23    107   BUN 12 16   CREATININE 0.8 0.8   * 114*       Recent Labs  Lab 07/02/17  0324 07/03/17  0520   CALCIUM 8.5* 8.9   MG  --  1.9   PHOS  --  3.2     LFT    Recent Labs  Lab 07/02/17  0324 07/03/17  0520   PROT 6.9 6.5   ALBUMIN 3.6 3.3*   BILITOT 1.5* 1.0   AST 17 11   ALKPHOS 86 76   ALT 16 13       COAGS    Recent Labs  Lab 07/02/17  0324   INR 1.1   APTT 29.2     CE    Recent Labs  Lab 07/02/17  0324 07/02/17  0823 07/02/17  1223   TROPONINI 0.060* 0.062* 0.045*     BNP    Recent Labs  Lab 07/02/17  0324   BNP 1,033*       Imaging  Imaging Results          X-Ray Chest AP Portable (Final result)  Result time 07/02/17 03:39:37    Final result by Sarina Sanchez MD (07/02/17 03:39:37)                 Impression:      Increased interstitial attenuation with patchy increased parenchymal attenuation, suggesting chronic change with superimposed edema, correlation advised.  The process is slightly more focal overlying the medial aspect of the right lower lobe, developing consolidation not excluded..        Electronically signed by: SARINA SANCHEZ MD  Date:     07/02/17  Time:    03:39              Narrative:    Chest AP portable    Indication:Shortness of breath    Comparison:CT 2/19/2017, radiograph 2/19/2017    Findings:  The cardiomediastinal silhouette is enlarged, similar to the previous examination noting calcification of the aortic arch.  There is no pleural effusion.  The trachea is midline.  The lungs are symmetrically expanded bilaterally with coarse interstitial attenuation bilaterally, apparently in a perihilar distribution, suggesting edema versus chronic interstitial change. No large focal  consolidation seen.  There is no pneumothorax.  The osseous structures are remarkable for degenerative changes of the spine and shoulders.                              Micro:  Microbiology Results (last 7 days)     Procedure Component Value Units Date/Time    Blood culture (site 1) [[id number]] Collected:  07/02/17 0535    Order Status:  Completed Specimen:  Blood from Peripheral, Forearm, Left Updated:  07/03/17 0812     Blood Culture, Routine No Growth to date     Blood Culture, Routine No Growth to date    Narrative:       Site # 1, aerobic and anaerobic (central line, if patient has  one)    Blood culture (site 2) [[id number]] Collected:  07/02/17 0540    Order Status:  Completed Specimen:  Blood from Peripheral, Forearm, Right Updated:  07/03/17 0812     Blood Culture, Routine No Growth to date     Blood Culture, Routine No Growth to date    Narrative:       Site # 2, aerobic only           Assessment/Plan: 57 yo male with pmhx of HTN and CHF with EF 20% (1/7/2016) admitted for CHF exacerbation vs PNA vs multifactorial.    Acute on chronic systolic (congestive) heart failure     EF 20% 02/2017  CXR: Increased interstitial attenuation with patchy increased parenchymal attenuation, suggesting chronic change with superimposed edema  BNP: 1033  Pt was on lasix 40 mg daily but has not had it for 2 weeks  40 lasix IV BID  Strict I/O's  UOP -5 L since admission.   Daily weights          Leukocytosis     - Initial WBC 15.28 with possible PNA  - WBC today 11.02  - Vitals stable  - LA and procalc wnl  - blood culture no growth to date.    - CXR suggestive of developing PNA,  Avelox started yesterday but changed to Doxy 2/2 prolonged QT noted on EKG.    - physical exam: CTA b/l          SOB (shortness of breath)     Resolved.  Stable on RA  On admission: With no chest pain, diaphoresis, nausea and vomiting. No history of stasis or any other medication use    Sob Likely secondary to fluid overload  Initial Trop elevated EKG  wnl  trended x 3 with downward trend and stable EKG  D-dimer : wnl           Essential hypertension     - stable with 24 hr -145/66-89  Continue home medication lisinopril and spironolactone        Dispo: Will discharge home today pending medication delivery to bedside and set up for home RX delivery.       Suma Tamayo D.O.  [employer]  HO-2  07/03/2017

## 2017-07-07 LAB
BACTERIA BLD CULT: NORMAL
BACTERIA BLD CULT: NORMAL

## 2017-09-15 DIAGNOSIS — I10 ESSENTIAL HYPERTENSION: Chronic | ICD-10-CM

## 2017-09-15 DIAGNOSIS — I50.21 ACUTE SYSTOLIC CONGESTIVE HEART FAILURE: ICD-10-CM

## 2017-09-15 DIAGNOSIS — R06.02 SOB (SHORTNESS OF BREATH): ICD-10-CM

## 2017-09-15 RX ORDER — LISINOPRIL 10 MG/1
TABLET ORAL
Qty: 90 TABLET | Refills: 1 | OUTPATIENT
Start: 2017-09-15

## 2018-04-19 ENCOUNTER — HOSPITAL ENCOUNTER (EMERGENCY)
Facility: HOSPITAL | Age: 58
Discharge: HOME | End: 2018-04-20
Attending: EMERGENCY MEDICINE

## 2018-04-19 DIAGNOSIS — R06.02 SHORTNESS OF BREATH: ICD-10-CM

## 2018-04-19 DIAGNOSIS — I50.43 ACUTE ON CHRONIC COMBINED SYSTOLIC AND DIASTOLIC CONGESTIVE HEART FAILURE: Primary | ICD-10-CM

## 2018-04-19 DIAGNOSIS — J81.0 ACUTE PULMONARY EDEMA: ICD-10-CM

## 2018-04-19 PROCEDURE — 99284 EMERGENCY DEPT VISIT MOD MDM: CPT | Mod: 25

## 2018-04-19 PROCEDURE — 96374 THER/PROPH/DIAG INJ IV PUSH: CPT

## 2018-04-20 VITALS
HEIGHT: 74 IN | SYSTOLIC BLOOD PRESSURE: 130 MMHG | TEMPERATURE: 98 F | RESPIRATION RATE: 21 BRPM | DIASTOLIC BLOOD PRESSURE: 83 MMHG | BODY MASS INDEX: 24.38 KG/M2 | OXYGEN SATURATION: 96 % | HEART RATE: 80 BPM | WEIGHT: 190 LBS

## 2018-04-20 LAB
ALBUMIN SERPL BCP-MCNC: 3.5 G/DL
ALP SERPL-CCNC: 93 U/L
ALT SERPL W/O P-5'-P-CCNC: 19 U/L
ANION GAP SERPL CALC-SCNC: 7 MMOL/L
AST SERPL-CCNC: 15 U/L
BASOPHILS # BLD AUTO: 0.01 K/UL
BASOPHILS NFR BLD: 0.1 %
BILIRUB SERPL-MCNC: 0.6 MG/DL
BILIRUB UR QL STRIP: NEGATIVE
BNP SERPL-MCNC: 912 PG/ML
BUN SERPL-MCNC: 14 MG/DL
CALCIUM SERPL-MCNC: 8.8 MG/DL
CHLORIDE SERPL-SCNC: 109 MMOL/L
CLARITY UR: CLEAR
CO2 SERPL-SCNC: 22 MMOL/L
COLOR UR: YELLOW
CREAT SERPL-MCNC: 0.9 MG/DL
DIFFERENTIAL METHOD: ABNORMAL
EOSINOPHIL # BLD AUTO: 0 K/UL
EOSINOPHIL NFR BLD: 0.3 %
ERYTHROCYTE [DISTWIDTH] IN BLOOD BY AUTOMATED COUNT: 14 %
EST. GFR  (AFRICAN AMERICAN): >60 ML/MIN/1.73 M^2
EST. GFR  (NON AFRICAN AMERICAN): >60 ML/MIN/1.73 M^2
GLUCOSE SERPL-MCNC: 130 MG/DL
GLUCOSE UR QL STRIP: NEGATIVE
HCT VFR BLD AUTO: 42 %
HGB BLD-MCNC: 14.3 G/DL
HGB UR QL STRIP: NEGATIVE
KETONES UR QL STRIP: NEGATIVE
LEUKOCYTE ESTERASE UR QL STRIP: NEGATIVE
LYMPHOCYTES # BLD AUTO: 1.6 K/UL
LYMPHOCYTES NFR BLD: 12.4 %
MCH RBC QN AUTO: 27.4 PG
MCHC RBC AUTO-ENTMCNC: 34 G/DL
MCV RBC AUTO: 81 FL
MONOCYTES # BLD AUTO: 0.9 K/UL
MONOCYTES NFR BLD: 7 %
NEUTROPHILS # BLD AUTO: 10.5 K/UL
NEUTROPHILS NFR BLD: 80 %
NITRITE UR QL STRIP: NEGATIVE
PH UR STRIP: 7 [PH] (ref 5–8)
PLATELET # BLD AUTO: 200 K/UL
PMV BLD AUTO: 10.2 FL
POTASSIUM SERPL-SCNC: 3.9 MMOL/L
PROT SERPL-MCNC: 6.7 G/DL
PROT UR QL STRIP: NEGATIVE
RBC # BLD AUTO: 5.21 M/UL
SODIUM SERPL-SCNC: 138 MMOL/L
SP GR UR STRIP: 1.01 (ref 1–1.03)
TROPONIN I SERPL DL<=0.01 NG/ML-MCNC: 0.04 NG/ML
URN SPEC COLLECT METH UR: NORMAL
UROBILINOGEN UR STRIP-ACNC: NEGATIVE EU/DL
WBC # BLD AUTO: 13.16 K/UL

## 2018-04-20 PROCEDURE — 85025 COMPLETE CBC W/AUTO DIFF WBC: CPT

## 2018-04-20 PROCEDURE — 83880 ASSAY OF NATRIURETIC PEPTIDE: CPT

## 2018-04-20 PROCEDURE — 63600175 PHARM REV CODE 636 W HCPCS: Performed by: EMERGENCY MEDICINE

## 2018-04-20 PROCEDURE — 80053 COMPREHEN METABOLIC PANEL: CPT

## 2018-04-20 PROCEDURE — 93005 ELECTROCARDIOGRAM TRACING: CPT

## 2018-04-20 PROCEDURE — 93010 ELECTROCARDIOGRAM REPORT: CPT | Mod: ,,, | Performed by: INTERNAL MEDICINE

## 2018-04-20 PROCEDURE — 25000003 PHARM REV CODE 250: Performed by: EMERGENCY MEDICINE

## 2018-04-20 PROCEDURE — 84484 ASSAY OF TROPONIN QUANT: CPT

## 2018-04-20 PROCEDURE — 81003 URINALYSIS AUTO W/O SCOPE: CPT

## 2018-04-20 RX ORDER — ASPIRIN 325 MG
325 TABLET ORAL
Status: COMPLETED | OUTPATIENT
Start: 2018-04-20 | End: 2018-04-20

## 2018-04-20 RX ORDER — FUROSEMIDE 10 MG/ML
80 INJECTION INTRAMUSCULAR; INTRAVENOUS
Status: COMPLETED | OUTPATIENT
Start: 2018-04-20 | End: 2018-04-20

## 2018-04-20 RX ORDER — FUROSEMIDE 40 MG/1
40 TABLET ORAL 2 TIMES DAILY
Qty: 30 TABLET | Refills: 0 | Status: SHIPPED | OUTPATIENT
Start: 2018-04-20 | End: 2019-04-20

## 2018-04-20 RX ADMIN — FUROSEMIDE 80 MG: 10 INJECTION, SOLUTION INTRAMUSCULAR; INTRAVENOUS at 01:04

## 2018-04-20 RX ADMIN — ASPIRIN 325 MG ORAL TABLET 325 MG: 325 PILL ORAL at 01:04

## 2018-04-20 RX ADMIN — NITROGLYCERIN 1 INCH: 20 OINTMENT TOPICAL at 12:04

## 2018-04-20 NOTE — ED NOTES
Pt to ED with complaints of shortness of breath. States he began to feel very SOB around 2300 last night. Pt states he has had this sensation before on multiple accounts, and it has always been due to CHF. Denies any chest pain at this time. Denies diaphoresis, nausea/vomiting, and dizziness.     APPEARANCE: Alert, oriented and in no acute distress.  CARDIAC: Tachycardic rate and normal rhythm. S1S2 noted. Pt denies chest arm, back, or jaw pain. Pt states he feels SOB. Pt denies nausea/vomiting, diaphoresis, and dizziness.   PERIPHERAL VASCULAR: peripheral pulses present. Normal cap refill. No edema. Warm to touch. 2+ radial pulses  RESPIRATORY:Normal rate and effort, breath sounds clear bilaterally throughout chest. Respirations are equal and unlabored no obvious signs of distress. Pt states he feels SOB.   MUSC: Full ROM. No bony tenderness or soft tissue tenderness. No obvious deformity.  SKIN: Skin is warm and dry, normal skin turgor, mucous membranes moist.  NEURO: 5/5 strength major flexors/extensors bilaterally. Sensory intact to light touch bilaterally. Yimi coma scale: eyes open spontaneously-4, oriented & converses-5, obeys commands-6. No neurological abnormalities.   MENTAL STATUS: awake, alert and aware of environment.  EYE: No obvious discharge.   ENT: EARS: no obvious drainage. NOSE: no active bleeding.

## 2018-04-20 NOTE — ED NOTES
Tested pt's oxygen saturation while walking around the unit. Pt lingered from 93-95%. Pt states he feels much better than he did earlier.

## 2018-04-20 NOTE — ED PROVIDER NOTES
Encounter Date: 4/19/2018    SCRIBE #1 NOTE: I, Alyssia Antonette, am scribing for, and in the presence of, Dr. Chidi Russell.       History     Chief Complaint   Patient presents with    Shortness of Breath     58y M ambulatory to ED with c/o SOB x30 mins, h/o CHF, feels like his normal CHF exacerbation     This is a 58 y.o. male who  has a past medical history of Acute systolic congestive heart failure (1/7/2016) and Hypertension.    This patient presents to the emergency department today with complaint of acutely worsened shortness of breath onset approximately one hour ago while he was lying in bed. Px reports he had been feeling somewhat SOB the past day or 2, but symptoms worsened this evening. The patient also reports nonproductive cough,  occasional chest pain, aching, only with cough, that is somewhat resolved in the ER. The patient denies fever, leg swelling. The patient states this episode is similar to his prior episodes of CHF exacerbation. He reports improvement to his shortness of breath on NC O2 supplementation in the ER. Denies any fever.          The history is provided by the patient.     Review of patient's allergies indicates:  No Known Allergies  Past Medical History:   Diagnosis Date    Acute systolic congestive heart failure 1/7/2016    EF 20%    Hypertension      Past Surgical History:   Procedure Laterality Date    LEG SURGERY       History reviewed. No pertinent family history.  Social History   Substance Use Topics    Smoking status: Never Smoker    Smokeless tobacco: Not on file    Alcohol use No      Comment: occ, last beer was on 7/1/17     Review of Systems   Constitutional: Negative for chills, fatigue and fever.   HENT: Negative for congestion, sore throat and voice change.    Eyes: Negative for photophobia, pain and redness.   Respiratory: Positive for cough and shortness of breath. Negative for choking.    Cardiovascular: Positive for chest pain. Negative for palpitations and  leg swelling.   Gastrointestinal: Negative for abdominal pain, diarrhea, nausea and vomiting.   Genitourinary: Negative for dysuria, flank pain and urgency.   Musculoskeletal: Negative for back pain, neck pain and neck stiffness.   Neurological: Negative for seizures, speech difficulty, light-headedness and numbness.   All other systems reviewed and are negative.      Physical Exam     Initial Vitals [04/19/18 2333]   BP Pulse Resp Temp SpO2   (!) 181/109 110 18 98.1 °F (36.7 °C) (!) 90 %      MAP       133         Physical Exam    Nursing note and vitals reviewed.  Constitutional: He appears well-developed and well-nourished. He appears distressed (Mild discomfort).   HENT:   Head: Normocephalic and atraumatic.   Mouth/Throat: Oropharynx is clear and moist.   Eyes: Conjunctivae and EOM are normal. Pupils are equal, round, and reactive to light.   Neck: Normal range of motion. Neck supple. No tracheal deviation present.   Cardiovascular: Normal rate, regular rhythm, normal heart sounds and intact distal pulses.   Pulmonary/Chest: No respiratory distress. He has no wheezes. He has no rhonchi. He has rales (bibasilar ).   Abdominal: Soft. Bowel sounds are normal. He exhibits no distension. There is no tenderness.   Musculoskeletal: Normal range of motion. He exhibits edema. He exhibits no tenderness.   Neurological: He is alert and oriented to person, place, and time. He has normal strength. No cranial nerve deficit or sensory deficit.   Skin: Skin is warm and dry. Capillary refill takes less than 2 seconds.   1+ pitting edema to bilateral lower extremities         ED Course   Procedures  Labs Reviewed   CBC W/ AUTO DIFFERENTIAL - Abnormal; Notable for the following:        Result Value    WBC 13.16 (*)     MCV 81 (*)     Gran # (ANC) 10.5 (*)     Gran% 80.0 (*)     Lymph% 12.4 (*)     All other components within normal limits   COMPREHENSIVE METABOLIC PANEL - Abnormal; Notable for the following:     CO2 22 (*)      Glucose 130 (*)     Anion Gap 7 (*)     All other components within normal limits   TROPONIN I - Abnormal; Notable for the following:     Troponin I 0.039 (*)     All other components within normal limits   B-TYPE NATRIURETIC PEPTIDE - Abnormal; Notable for the following:      (*)     All other components within normal limits   URINALYSIS     EKG Readings: (Independently Interpreted)   Initial Reading: No STEMI. Previous EKG: Compared with most recent EKG Previous EKG Date: 7/2/17 (Minimal change). Rhythm: Sinus Tachycardia. Heart Rate: 102. Ectopy: No Ectopy. Conduction: LVH with repolarization abnormality. ST Segments: Normal ST Segments. T Waves: Normal. Axis: Normal.       X-Rays:   Independently Interpreted Readings:   Other Readings:  Reviewed by myself, read by radiology.      Imaging Results          X-Ray Chest AP Portable (Final result)  Result time 04/20/18 00:22:09    Final result by Andrei Trevino MD (04/20/18 00:22:09)                 Impression:      Cardiomegaly with pulmonary vascular congestion and increased attenuation of the pulmonary parenchyma, suggestive of decompensated CHF.      Electronically signed by: Andrei Trevino MD  Date:    04/20/2018  Time:    00:22             Narrative:    EXAMINATION:  XR CHEST AP PORTABLE    CLINICAL HISTORY:  Shortness of breath;    TECHNIQUE:  Single frontal view of the chest was performed.    COMPARISON:  07/02/2017    FINDINGS:  Monitoring EKG leads are present.  The trachea is unremarkable.  There is stable enlargement of the cardiomediastinal silhouette.  The hemidiaphragms are unremarkable.  There is no evidence of free air beneath the hemidiaphragms.  There are no pleural effusions.  There is no evidence of a pneumothorax.  There is no evidence of pneumomediastinum.  There is pulmonary vascular congestion.  There is overall increased attenuation of the lung fields.  There are chronic pulmonary interstitial changes.  The osseous structures are  unremarkable.                               Medical Decision Making:   Initial Assessment:   58 year old male with history of CHF, HTN presents for shortness of breath onset approximately one hour ago while he was at home lying in bed with associated nonproductive cough and chest pain. The patient reports this episode is similar to his previous episodes of CHF exacerbation. Exam shows bibasilar rales, 1+ pitting edema to bilateral lower extremities.  Differential Diagnosis:   Differential Diagnosis includes, but is not limited to:  PE, MI/ACS, pneumothorax, pericardial effusion/tamonade, pneumonia, lung abscess, pericarditis/myocarditis, pleural effusion, lung mass, CHF exacerbation, asthma exacerbation, COPD exacerbation, aspirated/ingested foreign body, airway obstruction, CO poisoning, anemia, metabolic derangement, allergy/atopy, influenza, viral URI, viral syndrome.   Independently Interpreted Test(s):   I have ordered and independently interpreted X-rays - see prior notes.  I have ordered and independently interpreted EKG Reading(s) - see prior notes  Clinical Tests:   Lab Tests: Reviewed       <> Summary of Lab: Elevated BNP  ED Management:  Px given nitropaste, aspirin, Lasix. Px has heavily diuresed and reports significant improvement in symptoms at this time. Denies any CP since before arrival in ED. His O2 sat is stable with an ambulation trial around the ED. He prefers D/C at this time, although admission was offered. Instructed px to f/u with PCP and cardiologist, return immediately with recurrent or worsening sx.                       Clinical Impression:     1. Acute on chronic combined systolic and diastolic congestive heart failure    2. Shortness of breath    3. Acute pulmonary edema         Disposition:   Disposition: Discharged  Condition: Stable    Scribe attestation: I, Dr. Chidi Russell, personally performed the services described in this documentation. All medical record entries made by the  scribe were at my direction and in my presence.  I have reviewed the chart and agree that the record reflects my personal performance and is accurate and complete. Chidi Russell MD.  10:55 AM 04/22/2018                         Chidi Russell MD  04/22/18 1056

## 2018-05-02 ENCOUNTER — HOSPITAL ENCOUNTER (EMERGENCY)
Facility: HOSPITAL | Age: 58
Discharge: HOME | End: 2018-05-02
Attending: EMERGENCY MEDICINE

## 2018-05-02 VITALS
OXYGEN SATURATION: 96 % | TEMPERATURE: 99 F | HEART RATE: 76 BPM | WEIGHT: 190 LBS | DIASTOLIC BLOOD PRESSURE: 86 MMHG | SYSTOLIC BLOOD PRESSURE: 158 MMHG | RESPIRATION RATE: 16 BRPM | BODY MASS INDEX: 24.38 KG/M2 | HEIGHT: 74 IN

## 2018-05-02 DIAGNOSIS — S05.01XA ABRASION OF RIGHT CORNEA, INITIAL ENCOUNTER: Primary | ICD-10-CM

## 2018-05-02 PROCEDURE — 90471 IMMUNIZATION ADMIN: CPT | Performed by: EMERGENCY MEDICINE

## 2018-05-02 PROCEDURE — 25000003 PHARM REV CODE 250: Performed by: EMERGENCY MEDICINE

## 2018-05-02 PROCEDURE — 63600175 PHARM REV CODE 636 W HCPCS: Performed by: EMERGENCY MEDICINE

## 2018-05-02 PROCEDURE — 90714 TD VACC NO PRESV 7 YRS+ IM: CPT | Performed by: EMERGENCY MEDICINE

## 2018-05-02 PROCEDURE — 99283 EMERGENCY DEPT VISIT LOW MDM: CPT

## 2018-05-02 RX ORDER — TETRACAINE HYDROCHLORIDE 5 MG/ML
2 SOLUTION OPHTHALMIC
Status: DISCONTINUED | OUTPATIENT
Start: 2018-05-02 | End: 2018-05-02

## 2018-05-02 RX ORDER — PROPARACAINE HYDROCHLORIDE 5 MG/ML
1 SOLUTION/ DROPS OPHTHALMIC
Status: COMPLETED | OUTPATIENT
Start: 2018-05-02 | End: 2018-05-02

## 2018-05-02 RX ORDER — OXYCODONE AND ACETAMINOPHEN 5; 325 MG/1; MG/1
1 TABLET ORAL EVERY 4 HOURS PRN
Qty: 10 TABLET | Refills: 0 | Status: SHIPPED | OUTPATIENT
Start: 2018-05-02 | End: 2018-06-24 | Stop reason: SDUPTHER

## 2018-05-02 RX ORDER — KETOROLAC TROMETHAMINE 5 MG/ML
1 SOLUTION OPHTHALMIC 3 TIMES DAILY
Qty: 2 ML | Refills: 0 | Status: SHIPPED | OUTPATIENT
Start: 2018-05-02 | End: 2018-05-12

## 2018-05-02 RX ORDER — SULFACETAMIDE SODIUM 100 MG/ML
2 SOLUTION/ DROPS OPHTHALMIC EVERY 4 HOURS
Qty: 1 BOTTLE | Status: SHIPPED | OUTPATIENT
Start: 2018-05-02

## 2018-05-02 RX ADMIN — TETANUS AND DIPHTHERIA TOXOIDS ADSORBED 0.5 ML: 2; 2 INJECTION INTRAMUSCULAR at 09:05

## 2018-05-02 RX ADMIN — PROPARACAINE HYDROCHLORIDE 1 DROP: 5 SOLUTION/ DROPS OPHTHALMIC at 08:05

## 2018-05-03 NOTE — ED NOTES
Pt presents to the ED w/ c/o having a right eye pain. Pt reports that he works as a  and while at work a piece of sheetrock flew into his right eye. Pt reports that he had pain in his right eye but continued to work. Pt denies blurry vision in right eye. Pt reports he had been using eye drops at home without relief.

## 2018-05-03 NOTE — ED PROVIDER NOTES
Encounter Date: 5/2/2018    SCRIBE #1 NOTE: I, Eayd Pham, am scribing for, and in the presence of,  Dr. Burciaga. I have scribed the entire note.       History     Chief Complaint   Patient presents with    Eye Problem     pt to triage ambulatory and reports while working today and had safety glasses on, may have gotten some pieces of sheetrock dust in right eye; right eye red and irritated and tearing     Time seen by provider: 8:14 PM    This is a 58 y.o. male who presents with complaint of redness, tearing, and irritation to the right eye which began today around 11:00 AM. Patient reports that he was working with safety glasses on, but thinks he may have sheetrock dust in his right eye. He does report having a foreign body sensation. Patient denies any HA, photophobia, or any other symptoms. Patient has been using eye drops without much relief. Patient does not wear contacts, but he does wear reading glasses. Patient reports that his tetanus shot is not up to date.      The history is provided by the patient.     Review of patient's allergies indicates:  No Known Allergies  Past Medical History:   Diagnosis Date    Acute systolic congestive heart failure 1/7/2016    EF 20%    Hypertension      Past Surgical History:   Procedure Laterality Date    LEG SURGERY       History reviewed. No pertinent family history.  Social History   Substance Use Topics    Smoking status: Never Smoker    Smokeless tobacco: Not on file    Alcohol use No      Comment: occ, last beer was on 7/1/17     Review of Systems   Constitutional: Negative.  Negative for chills and fever.   HENT: Negative.  Negative for facial swelling and trouble swallowing.    Eyes: Positive for pain, discharge and redness. Negative for photophobia, itching and visual disturbance.        Right eye tearing   Respiratory: Negative.  Negative for shortness of breath.    Cardiovascular: Negative.  Negative for chest pain.   Gastrointestinal: Negative.  Negative  for abdominal pain, diarrhea, nausea and vomiting.   Endocrine: Negative.    Genitourinary: Negative.  Negative for dysuria and hematuria.   Musculoskeletal: Negative.  Negative for gait problem.   Skin: Negative.  Negative for rash.   Allergic/Immunologic: Negative.    Neurological: Negative.  Negative for facial asymmetry and speech difficulty.   Hematological: Negative.    Psychiatric/Behavioral: Negative.    All other systems reviewed and are negative.      Physical Exam     Initial Vitals [05/02/18 1956]   BP Pulse Resp Temp SpO2   (!) 171/95 93 20 98.6 °F (37 °C) 98 %      MAP       120.33         Physical Exam    Nursing note and vitals reviewed.  Constitutional: He appears well-developed and well-nourished.   HENT:   Head: Normocephalic and atraumatic.   Eyes: EOM and lids are normal. Pupils are equal, round, and reactive to light. Lids are everted and swept, no foreign bodies found. Right eye exhibits discharge. Right conjunctiva is injected.       R eye: Conjunctiva injected, positive tearing, positive for follow-up, and reactive, positive corneal abrasion at 10 o'clock, negative tenderness palpation, no periorbital edema, negative Jackeline test; visual acuity right eye 20/20 , 20/20, (-) FB   Musculoskeletal: Normal range of motion.   Neurological: He is alert and oriented to person, place, and time. He has normal strength and normal reflexes.   Skin: Skin is warm and dry. Capillary refill takes less than 2 seconds.   Psychiatric: He has a normal mood and affect. His behavior is normal. Judgment and thought content normal.         ED Course   Procedures  Labs Reviewed - No data to display          Medical Decision Making:   Initial Assessment:   This is a 58 y.o. male who presents with complaint of redness, tearing, and irritation to the right eye which began today around 11:00 AM. Patient reports that he was working with safety glasses on, but thinks he may have sheetrock dust in his right eye. He does  report having a foreign body sensation. Patient denies any HA, photophobia, or any other symptoms. Patient has been using eye drops without much relief. Patient does not wear contacts, but he does wear reading glasses. Patient reports that his tetanus shot is not up to date.    Differential Diagnosis:   Corneal abrasion  Ulcer  Foreign body  ED Management:  Tetanus updated, (-) FB, resolution of pain with tetracaine.  Will discharge pt home with bleph-10, PO/topical pain meds.                      Clinical Impression:   Corneal abrasion    Disposition:   Disposition: Discharged  Condition: Stable                 Darian Burciaga MD  05/02/18 2212       Darian Burciaga MD  05/02/18 2212       Darian Burciaga MD  05/02/18 2229

## 2018-06-24 ENCOUNTER — HOSPITAL ENCOUNTER (EMERGENCY)
Facility: HOSPITAL | Age: 58
Discharge: HOME | End: 2018-06-24
Attending: EMERGENCY MEDICINE

## 2018-06-24 VITALS
DIASTOLIC BLOOD PRESSURE: 83 MMHG | HEART RATE: 100 BPM | TEMPERATURE: 99 F | WEIGHT: 190 LBS | SYSTOLIC BLOOD PRESSURE: 140 MMHG | BODY MASS INDEX: 24.38 KG/M2 | HEIGHT: 74 IN | OXYGEN SATURATION: 98 % | RESPIRATION RATE: 20 BRPM

## 2018-06-24 DIAGNOSIS — S62.514A CLOSED NONDISPLACED FRACTURE OF PROXIMAL PHALANX OF RIGHT THUMB, INITIAL ENCOUNTER: Primary | ICD-10-CM
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
## 2018-06-24 DIAGNOSIS — S32.009A CLOSED FRACTURE OF TRANSVERSE PROCESS OF LUMBAR VERTEBRA, INITIAL ENCOUNTER: ICD-10-CM

## 2018-06-24 PROCEDURE — 25000003 PHARM REV CODE 250: Performed by: EMERGENCY MEDICINE

## 2018-06-24 PROCEDURE — 96372 THER/PROPH/DIAG INJ SC/IM: CPT | Mod: 59

## 2018-06-24 PROCEDURE — 63600175 PHARM REV CODE 636 W HCPCS: Performed by: EMERGENCY MEDICINE

## 2018-06-24 PROCEDURE — 26720 TREAT FINGER FRACTURE EACH: CPT | Mod: RT

## 2018-06-24 PROCEDURE — 99284 EMERGENCY DEPT VISIT MOD MDM: CPT | Mod: 25

## 2018-06-24 RX ORDER — OXYCODONE AND ACETAMINOPHEN 7.5; 325 MG/1; MG/1
1 TABLET ORAL ONCE
Status: COMPLETED | OUTPATIENT
Start: 2018-06-24 | End: 2018-06-24

## 2018-06-24 RX ORDER — KETOROLAC TROMETHAMINE 30 MG/ML
60 INJECTION, SOLUTION INTRAMUSCULAR; INTRAVENOUS
Status: COMPLETED | OUTPATIENT
Start: 2018-06-24 | End: 2018-06-24

## 2018-06-24 RX ORDER — OXYCODONE AND ACETAMINOPHEN 10; 325 MG/1; MG/1
1 TABLET ORAL EVERY 6 HOURS PRN
Qty: 20 TABLET | Refills: 0 | Status: SHIPPED | OUTPATIENT
Start: 2018-06-24

## 2018-06-24 RX ORDER — ORPHENADRINE CITRATE 30 MG/ML
60 INJECTION INTRAMUSCULAR; INTRAVENOUS
Status: COMPLETED | OUTPATIENT
Start: 2018-06-24 | End: 2018-06-24

## 2018-06-24 RX ORDER — ONDANSETRON 4 MG/1
4 TABLET, ORALLY DISINTEGRATING ORAL
Status: COMPLETED | OUTPATIENT
Start: 2018-06-24 | End: 2018-06-24

## 2018-06-24 RX ADMIN — ORPHENADRINE CITRATE 60 MG: 30 INJECTION INTRAMUSCULAR; INTRAVENOUS at 11:06

## 2018-06-24 RX ADMIN — ONDANSETRON 4 MG: 4 TABLET, ORALLY DISINTEGRATING ORAL at 08:06

## 2018-06-24 RX ADMIN — OXYCODONE HYDROCHLORIDE AND ACETAMINOPHEN 1 TABLET: 7.5; 325 TABLET ORAL at 08:06

## 2018-06-24 RX ADMIN — KETOROLAC TROMETHAMINE 60 MG: 30 INJECTION, SOLUTION INTRAMUSCULAR at 11:06

## 2018-06-24 NOTE — ED NOTES
Information along with Physician's Notes and Xray and CT reports faxed to [facility]. All information and paperwork along with Disc with xrays and reports given to pt and explained that [facility] will be calling him to make an appointment and disc with reports must be taken with him to appointments. Pt verbalizes understanding.
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
## 2018-12-04 ENCOUNTER — OCCUPATIONAL HEALTH (OUTPATIENT)
Dept: URGENT CARE | Facility: CLINIC | Age: 58
End: 2018-12-04

## 2018-12-04 DIAGNOSIS — Z02.83 ENCOUNTER FOR DRUG SCREENING: Primary | ICD-10-CM

## 2018-12-04 LAB
CTP QC/QA: YES
POC 10 PANEL DRUG SCREEN: NEGATIVE

## 2018-12-04 PROCEDURE — 80305 DRUG TEST PRSMV DIR OPT OBS: CPT | Mod: QW,S$GLB,, | Performed by: PREVENTIVE MEDICINE

## 2022-08-23 ENCOUNTER — HOSPITAL ENCOUNTER (EMERGENCY)
Facility: HOSPITAL | Age: 62
Discharge: HOME | End: 2022-08-23
Attending: EMERGENCY MEDICINE

## 2022-08-23 VITALS
WEIGHT: 190 LBS | RESPIRATION RATE: 18 BRPM | HEIGHT: 73 IN | DIASTOLIC BLOOD PRESSURE: 86 MMHG | SYSTOLIC BLOOD PRESSURE: 173 MMHG | TEMPERATURE: 99 F | HEART RATE: 74 BPM | BODY MASS INDEX: 25.18 KG/M2 | OXYGEN SATURATION: 98 %

## 2022-08-23 DIAGNOSIS — S61.411A LACERATION OF RIGHT HAND WITHOUT FOREIGN BODY, INITIAL ENCOUNTER: Primary | ICD-10-CM

## 2022-08-23 PROCEDURE — 99284 EMERGENCY DEPT VISIT MOD MDM: CPT

## 2022-08-23 PROCEDURE — 25000003 PHARM REV CODE 250

## 2022-08-23 PROCEDURE — 96372 THER/PROPH/DIAG INJ SC/IM: CPT

## 2022-08-23 PROCEDURE — 12002 RPR S/N/AX/GEN/TRNK2.6-7.5CM: CPT | Mod: RT

## 2022-08-23 PROCEDURE — 63600175 PHARM REV CODE 636 W HCPCS

## 2022-08-23 RX ORDER — LIDOCAINE HYDROCHLORIDE 10 MG/ML
10 INJECTION, SOLUTION EPIDURAL; INFILTRATION; INTRACAUDAL; PERINEURAL
Status: COMPLETED | OUTPATIENT
Start: 2022-08-23 | End: 2022-08-23

## 2022-08-23 RX ORDER — CEFAZOLIN SODIUM 1 G/3ML
1 INJECTION, POWDER, FOR SOLUTION INTRAMUSCULAR; INTRAVENOUS
Status: COMPLETED | OUTPATIENT
Start: 2022-08-23 | End: 2022-08-23

## 2022-08-23 RX ADMIN — CEFAZOLIN 1 G: 330 INJECTION, POWDER, FOR SOLUTION INTRAMUSCULAR; INTRAVENOUS at 09:08

## 2022-08-23 RX ADMIN — LIDOCAINE HYDROCHLORIDE 100 MG: 10 INJECTION, SOLUTION EPIDURAL; INFILTRATION; INTRACAUDAL; PERINEURAL at 08:08

## 2022-08-23 NOTE — Clinical Note
"Randy JOSEJAMISON Mcwilliams was seen and treated in our emergency department on 8/23/2022.  He may return to work on 09/02/2022.       If you have any questions or concerns, please don't hesitate to call.      Vicky Olea MD"

## 2022-08-24 NOTE — ED PROVIDER NOTES
Encounter Date: 8/23/2022       History     Chief Complaint   Patient presents with    Laceration     Patient reports having a laceration to the right hand. States laceration was occurred after catching himself during a trip and fall at work. States his hand grabbed a floor track. Laceration noted to the base of the 3rd and 4th digits on the palm side of the hand.      63 y/o M with a PMHx of HTN, CHF (EF 20%) and reported Afib presents to the ED c/o R hand laceration that occurred today at 2 PM s/p trip and fall onto a metal object. He rates the pain to be a 3/10 in terms of intensity. He denies LOC, head trauma or pain anywhere else. He denies all other symptoms at this time.    The history is provided by the patient. No  was used.     Review of patient's allergies indicates:  No Known Allergies  Past Medical History:   Diagnosis Date    Acute systolic congestive heart failure 1/7/2016    EF 20%    Hypertension      Past Surgical History:   Procedure Laterality Date    LEG SURGERY       No family history on file.  Social History     Tobacco Use    Smoking status: Never Smoker   Substance Use Topics    Alcohol use: No     Alcohol/week: 2.0 standard drinks     Types: 2 Cans of beer per week     Comment: occ, last beer was on 7/1/17    Drug use: No     Review of Systems   Constitutional: Negative for activity change, chills and fever.   HENT: Negative for congestion, ear pain and sore throat.    Respiratory: Negative for shortness of breath and stridor.    Cardiovascular: Negative for chest pain and palpitations.   Gastrointestinal: Negative for abdominal pain, nausea and vomiting.   Genitourinary: Negative for dysuria and urgency.   Musculoskeletal: Negative for back pain.   Skin: Positive for wound. Negative for rash.        Laceration to R hand   Allergic/Immunologic: Negative for environmental allergies, food allergies and immunocompromised state.   Neurological: Negative for dizziness,  syncope, weakness and headaches.   Hematological: Does not bruise/bleed easily.       Physical Exam     Initial Vitals [08/23/22 1920]   BP Pulse Resp Temp SpO2   (!) 173/86 74 18 98.5 °F (36.9 °C) 98 %      MAP       --         Physical Exam    Nursing note and vitals reviewed.  Constitutional: Vital signs are normal. He appears well-developed and well-nourished. He is not diaphoretic. No distress.   HENT:   Head: Normocephalic and atraumatic.   Right Ear: External ear normal.   Left Ear: External ear normal.   Neck: Trachea normal. Neck supple. No thyroid mass present.   Cardiovascular: Normal rate, regular rhythm, normal heart sounds and intact distal pulses. Exam reveals no gallop and no friction rub.    No murmur heard.  Pulmonary/Chest: Breath sounds normal. No respiratory distress. He has no wheezes. He has no rhonchi. He has no rales.   Abdominal: Abdomen is soft. Bowel sounds are normal. He exhibits no distension. There is no abdominal tenderness. There is no rebound and no guarding.   Musculoskeletal:      Cervical back: Neck supple.      Comments: Good ROM of the R wrist and hand with some limitation of flexion of the 3rd and 4th digit due to pain and laceration.     Neurological: He is alert and oriented to person, place, and time. He has normal strength. No cranial nerve deficit or sensory deficit. GCS score is 15. GCS eye subscore is 4. GCS verbal subscore is 5. GCS motor subscore is 6.   Skin: Skin is warm and dry. Capillary refill takes less than 2 seconds. No rash noted.   2 cm linear laceration at the MCP joint area on middle finger.    2.5 cm linear laceration at the MCP joint area on the 4th digit.     No tendon appreciated. No foreign body appreciated.    Psychiatric: He has a normal mood and affect.         ED Course   Lac Repair    Date/Time: 8/23/2022 9:49 PM  Performed by: Vicky Olea MD  Authorized by: Jessica Treviño MD     Consent:     Consent obtained:  Verbal    Consent given  by:  Patient    Risks discussed:  Infection, pain and poor cosmetic result  Universal protocol:     Patient identity confirmed:  Verbally with patient and arm band  Anesthesia:     Anesthesia method:  Local infiltration    Local anesthetic:  Lidocaine 1% w/o epi  Laceration details:     Location:  Finger    Finger location: Base of 3rd and 4th digits on R hand.    Wound length (cm): 3rd digit: 2 cm; 4th digit: 2.5 cm.    Depth (mm):  5  Pre-procedure details:     Preparation:  Patient was prepped and draped in usual sterile fashion  Treatment:     Area cleansed with:  Povidone-iodine    Amount of cleaning:  Standard    Irrigation solution:  Sterile water    Irrigation volume:  500    Irrigation method:  Pressure wash    Visualized foreign bodies/material removed: no      Debridement:  None    Undermining:  None    Scar revision: no    Skin repair:     Repair method:  Sutures    Suture size:  5-0    Suture material:  Prolene    Suture technique:  Simple interrupted    Number of sutures: 5 on 4th digit. 3 on 3rd digit.  Approximation:     Approximation:  Close  Repair type:     Repair type:  Simple  Post-procedure details:     Dressing:  Open (no dressing)    Procedure completion:  Tolerated well, no immediate complications      Labs Reviewed - No data to display       Imaging Results    None          Medications   LIDOcaine (PF) 10 mg/ml (1%) injection 100 mg (100 mg Intradermal Given 8/23/22 2045)   ceFAZolin injection 1 g (1 g Intramuscular Given 8/23/22 2151)     Medical Decision Making:   Initial Assessment:   61 y/o M who appears to be in NAD presents to the ED c/o R hand lacerations 2/2 trip and fall onto metal object. ABCs intact. Physical exam shows 2 linear lacerations to the 3rd and 4th digit.   Differential Diagnosis:   Laceration  Unlikely fracture  ED Management:  Patient states his last tetanus shot was 2 years ago. Repaired the two lacerations on the patient's R hand. I initially pressure washed the  lacerations with sterile water prior to performing a nerve block of the 3rd and 4th digits. He tolerated the procedure without any immediate complications. I will give the patient 1g of IM Ancef and will place the patient in a hand splint to prevent hyper-extension or hyper-flexion of the fingers which may disrupt the healing of the wound. Return precautions provided and I will discharge the patient.            Attending Attestation:   Physician Attestation Statement for Resident:  As the supervising MD   Physician Attestation Statement: I have personally seen and examined this patient.   I agree with the above history. -:   As the supervising MD I agree with the above PE.  I was personally present during the entire procedure.                         Clinical Impression:   Final diagnoses:  [S61.411A] Laceration of right hand without foreign body, initial encounter (Primary)          ED Disposition Condition    Discharge Stable        ED Prescriptions     None        Follow-up Information     Follow up With Specialties Details Why Contact Info    [facility] Child and Adolescent Psychiatry, Psychology, Family Medicine, Obstetrics Schedule an appointment as soon as possible for a visit in 10 days For suture removal [address]  [address]  [facility] [address]  [phone]      [place] - Emergency Dept Emergency Medicine Go to  If symptoms worsen [address] [id number] [phone]           Vicky Olea MD  Resident  08/23/22 2154       Vicky Olea MD  Resident  08/23/22 2209       Jessica Treviño MD  08/25/22 2221

## 2022-09-02 ENCOUNTER — HOSPITAL ENCOUNTER (EMERGENCY)
Facility: HOSPITAL | Age: 62
Discharge: HOME | End: 2022-09-02
Attending: EMERGENCY MEDICINE

## 2022-09-02 VITALS
RESPIRATION RATE: 16 BRPM | TEMPERATURE: 98 F | DIASTOLIC BLOOD PRESSURE: 74 MMHG | SYSTOLIC BLOOD PRESSURE: 127 MMHG | OXYGEN SATURATION: 98 % | BODY MASS INDEX: 25.07 KG/M2 | HEART RATE: 85 BPM | WEIGHT: 190 LBS

## 2022-09-02 DIAGNOSIS — Z51.89 VISIT FOR WOUND CHECK: Primary | ICD-10-CM

## 2022-09-02 DIAGNOSIS — Z48.02 VISIT FOR SUTURE REMOVAL: ICD-10-CM

## 2022-09-02 PROCEDURE — 99282 EMERGENCY DEPT VISIT SF MDM: CPT

## 2022-09-02 NOTE — DISCHARGE INSTRUCTIONS

## 2022-09-02 NOTE — ED PROVIDER NOTES
Encounter Date: 9/2/2022       History     Chief Complaint   Patient presents with    Suture / Staple Removal     Stiches placed to peterson side of right hand 3-4 th digit, placed on 08/23, request removal      62-year-old male presents to ED requesting suture removal from right fingers, placed on 08/23.  Patient reports wound sites have continued to heal well with no complications.  He has continued keep area clean with no drainage.  Reports significant improvement of pains.  No other acute complaints at this time.    The history is provided by the patient.   Review of patient's allergies indicates:  No Known Allergies  Past Medical History:   Diagnosis Date    Acute systolic congestive heart failure 1/7/2016    EF 20%    Hypertension      Past Surgical History:   Procedure Laterality Date    LEG SURGERY       No family history on file.  Social History     Tobacco Use    Smoking status: Never   Substance Use Topics    Alcohol use: No     Alcohol/week: 2.0 standard drinks     Types: 2 Cans of beer per week     Comment: occ, last beer was on 7/1/17    Drug use: No     Review of Systems   Constitutional:  Negative for chills and fever.   Skin:  Positive for wound.   Neurological:  Negative for weakness and numbness.     Physical Exam     Initial Vitals [09/02/22 1037]   BP Pulse Resp Temp SpO2   127/74 85 16 98.3 °F (36.8 °C) 98 %      MAP       --         Physical Exam    Nursing note and vitals reviewed.  Constitutional: Vital signs are normal. He appears well-developed and well-nourished. He is cooperative. He does not have a sickly appearance. He does not appear ill. No distress.   HENT:   Head: Normocephalic and atraumatic.   Eyes: EOM are normal.   Neck:   Normal range of motion.  Musculoskeletal:      Cervical back: Normal range of motion.     Neurological: He is alert. GCS eye subscore is 4. GCS verbal subscore is 5. GCS motor subscore is 6.   Skin:   Laceration sites x2 to volar aspect of 3rd and 4th fingers  appear to be well-healed.  Sutures remaining intact.   Psychiatric: He has a normal mood and affect. His speech is normal and behavior is normal.       ED Course   Suture Removal    Date/Time: 9/2/2022 11:01 AM  Location procedure was performed: [facility] EMERGENCY DEPARTMENT  Performed by: Michelet Bae PA-C  Authorized by: Michelet Bae PA-C   Body area: upper extremity (Right long and ring fingers)  Wound Appearance: clean, well healed and no drainage  Sutures Removed: 8  Facility: sutures placed in this facility      Labs Reviewed - No data to display       Imaging Results    None          Medications - No data to display  Medical Decision Making:   Initial Assessment:   Patient presents for wound recheck and suture removal from laceration sites on right 4th and 5th fingers.  Sutures placed on 08/23.  Patient denying any complications.  Wound sites appear to be healing well on exam.  Differential Diagnosis:   Wound check, suture removal  ED Management:  Sutures removed without complications.  Patient tolerated well.  Encouraged to keep area clean and dry and monitor wound healing closely.  Patient states his understanding and agrees with plan.                    Clinical Impression:   Final diagnoses:  [Z51.89] Visit for wound check (Primary)  [Z48.02] Visit for suture removal      ED Disposition Condition    Discharge Stable          ED Prescriptions    None       Follow-up Information    None          Michelet Bae PA-C  09/02/22 1104

## 2022-09-02 NOTE — ED NOTES
Bed: EXAM 17  Expected date: 9/2/22  Expected time: 7:18 AM  Means of arrival:   Comments:  Procedure

## 2022-09-02 NOTE — Clinical Note
"Randy JOSEJAMISON Mcwilliams was seen and treated in our emergency department on 9/2/2022.  He may return to work on 09/03/2022.       If you have any questions or concerns, please don't hesitate to call.      Michelet Bae PA-C"